# Patient Record
Sex: FEMALE | Race: WHITE | Employment: UNEMPLOYED | URBAN - NONMETROPOLITAN AREA
[De-identification: names, ages, dates, MRNs, and addresses within clinical notes are randomized per-mention and may not be internally consistent; named-entity substitution may affect disease eponyms.]

---

## 2017-01-08 DIAGNOSIS — I10 ESSENTIAL HYPERTENSION: Primary | ICD-10-CM

## 2017-01-09 RX ORDER — LOSARTAN POTASSIUM AND HYDROCHLOROTHIAZIDE 12.5; 5 MG/1; MG/1
TABLET ORAL
Qty: 90 TABLET | Refills: 2 | Status: SHIPPED | OUTPATIENT
Start: 2017-01-09 | End: 2017-03-24 | Stop reason: SDUPTHER

## 2017-01-31 ENCOUNTER — TELEPHONE (OUTPATIENT)
Dept: FAMILY MEDICINE CLINIC | Age: 67
End: 2017-01-31

## 2017-03-24 ENCOUNTER — OFFICE VISIT (OUTPATIENT)
Dept: FAMILY MEDICINE CLINIC | Age: 67
End: 2017-03-24
Payer: MEDICARE

## 2017-03-24 VITALS
HEIGHT: 66 IN | BODY MASS INDEX: 38.25 KG/M2 | SYSTOLIC BLOOD PRESSURE: 136 MMHG | OXYGEN SATURATION: 96 % | RESPIRATION RATE: 16 BRPM | WEIGHT: 238 LBS | HEART RATE: 74 BPM | DIASTOLIC BLOOD PRESSURE: 88 MMHG

## 2017-03-24 DIAGNOSIS — I10 ESSENTIAL HYPERTENSION: Primary | ICD-10-CM

## 2017-03-24 DIAGNOSIS — Z11.4 ENCOUNTER FOR SCREENING FOR HIV: ICD-10-CM

## 2017-03-24 DIAGNOSIS — F41.1 GENERALIZED ANXIETY DISORDER: ICD-10-CM

## 2017-03-24 DIAGNOSIS — Z11.59 NEED FOR HEPATITIS C SCREENING TEST: ICD-10-CM

## 2017-03-24 DIAGNOSIS — Z23 NEED FOR PNEUMOCOCCAL VACCINE: ICD-10-CM

## 2017-03-24 PROCEDURE — G8484 FLU IMMUNIZE NO ADMIN: HCPCS | Performed by: FAMILY MEDICINE

## 2017-03-24 PROCEDURE — 3017F COLORECTAL CA SCREEN DOC REV: CPT | Performed by: FAMILY MEDICINE

## 2017-03-24 PROCEDURE — G8417 CALC BMI ABV UP PARAM F/U: HCPCS | Performed by: FAMILY MEDICINE

## 2017-03-24 PROCEDURE — 1123F ACP DISCUSS/DSCN MKR DOCD: CPT | Performed by: FAMILY MEDICINE

## 2017-03-24 PROCEDURE — G8427 DOCREV CUR MEDS BY ELIG CLIN: HCPCS | Performed by: FAMILY MEDICINE

## 2017-03-24 PROCEDURE — 99213 OFFICE O/P EST LOW 20 MIN: CPT | Performed by: FAMILY MEDICINE

## 2017-03-24 PROCEDURE — 3014F SCREEN MAMMO DOC REV: CPT | Performed by: FAMILY MEDICINE

## 2017-03-24 PROCEDURE — 90732 PPSV23 VACC 2 YRS+ SUBQ/IM: CPT | Performed by: FAMILY MEDICINE

## 2017-03-24 PROCEDURE — 1090F PRES/ABSN URINE INCON ASSESS: CPT | Performed by: FAMILY MEDICINE

## 2017-03-24 PROCEDURE — G0009 ADMIN PNEUMOCOCCAL VACCINE: HCPCS | Performed by: FAMILY MEDICINE

## 2017-03-24 PROCEDURE — G8400 PT W/DXA NO RESULTS DOC: HCPCS | Performed by: FAMILY MEDICINE

## 2017-03-24 PROCEDURE — 4040F PNEUMOC VAC/ADMIN/RCVD: CPT | Performed by: FAMILY MEDICINE

## 2017-03-24 PROCEDURE — 1036F TOBACCO NON-USER: CPT | Performed by: FAMILY MEDICINE

## 2017-03-24 RX ORDER — LOSARTAN POTASSIUM AND HYDROCHLOROTHIAZIDE 12.5; 5 MG/1; MG/1
TABLET ORAL
Qty: 90 TABLET | Refills: 3 | Status: SHIPPED | OUTPATIENT
Start: 2017-03-24 | End: 2018-03-20 | Stop reason: SDUPTHER

## 2017-03-24 RX ORDER — CLORAZEPATE DIPOTASSIUM 7.5 MG/1
7.5 TABLET ORAL 2 TIMES DAILY PRN
Qty: 100 TABLET | Refills: 0 | Status: SHIPPED | OUTPATIENT
Start: 2017-03-24 | End: 2017-06-29 | Stop reason: SDUPTHER

## 2017-03-24 ASSESSMENT — PATIENT HEALTH QUESTIONNAIRE - PHQ9
2. FEELING DOWN, DEPRESSED OR HOPELESS: 0
1. LITTLE INTEREST OR PLEASURE IN DOING THINGS: 0
SUM OF ALL RESPONSES TO PHQ QUESTIONS 1-9: 0
SUM OF ALL RESPONSES TO PHQ9 QUESTIONS 1 & 2: 0

## 2017-03-24 ASSESSMENT — ENCOUNTER SYMPTOMS
SHORTNESS OF BREATH: 0
CHEST TIGHTNESS: 0
COUGH: 0
WHEEZING: 0

## 2017-06-29 DIAGNOSIS — F41.1 GENERALIZED ANXIETY DISORDER: ICD-10-CM

## 2017-06-29 RX ORDER — CLORAZEPATE DIPOTASSIUM 7.5 MG/1
7.5 TABLET ORAL 2 TIMES DAILY PRN
Qty: 100 TABLET | Refills: 0 | Status: SHIPPED | OUTPATIENT
Start: 2017-06-29 | End: 2017-10-11 | Stop reason: SDUPTHER

## 2017-07-12 ENCOUNTER — TELEPHONE (OUTPATIENT)
Dept: FAMILY MEDICINE CLINIC | Age: 67
End: 2017-07-12

## 2017-10-11 DIAGNOSIS — F41.1 GENERALIZED ANXIETY DISORDER: ICD-10-CM

## 2017-10-12 RX ORDER — CLORAZEPATE DIPOTASSIUM 7.5 MG/1
7.5 TABLET ORAL 2 TIMES DAILY PRN
Qty: 100 TABLET | Refills: 0 | Status: SHIPPED | OUTPATIENT
Start: 2017-10-12 | End: 2018-01-22 | Stop reason: SDUPTHER

## 2017-11-30 ENCOUNTER — NURSE ONLY (OUTPATIENT)
Dept: LAB | Age: 67
End: 2017-11-30
Payer: MEDICARE

## 2017-11-30 DIAGNOSIS — Z23 NEED FOR PROPHYLACTIC VACCINATION AND INOCULATION AGAINST INFLUENZA: Primary | ICD-10-CM

## 2017-11-30 PROCEDURE — 90662 IIV NO PRSV INCREASED AG IM: CPT | Performed by: FAMILY MEDICINE

## 2017-11-30 PROCEDURE — G0008 ADMIN INFLUENZA VIRUS VAC: HCPCS | Performed by: FAMILY MEDICINE

## 2017-11-30 NOTE — PROGRESS NOTES
Have you had an allergic reaction to the flu (influenza) shot? no  Are you allergic to eggs or any component of the flu vaccine? no  Do you have a history of Guillain-[place] Syndrome (GBS), which is paralysis after receiving the flu vaccine? no  Are you feeling well today? yes  Flu vaccine given as ordered. Patient tolerated it well. No questions re: VIS information.

## 2017-12-27 ENCOUNTER — OFFICE VISIT (OUTPATIENT)
Dept: FAMILY MEDICINE CLINIC | Age: 67
End: 2017-12-27
Payer: MEDICARE

## 2017-12-27 ENCOUNTER — HOSPITAL ENCOUNTER (OUTPATIENT)
Dept: MAMMOGRAPHY | Age: 67
Discharge: HOME | End: 2017-12-27
Payer: MEDICARE

## 2017-12-27 VITALS
SYSTOLIC BLOOD PRESSURE: 130 MMHG | HEART RATE: 68 BPM | HEIGHT: 66 IN | WEIGHT: 282.4 LBS | TEMPERATURE: 98.2 F | OXYGEN SATURATION: 98 % | BODY MASS INDEX: 45.38 KG/M2 | DIASTOLIC BLOOD PRESSURE: 88 MMHG

## 2017-12-27 DIAGNOSIS — Z12.31 SCREENING MAMMOGRAM, ENCOUNTER FOR: ICD-10-CM

## 2017-12-27 DIAGNOSIS — M19.019 SHOULDER ARTHRITIS: Primary | ICD-10-CM

## 2017-12-27 DIAGNOSIS — Z00.00 ROUTINE GENERAL MEDICAL EXAMINATION AT A HEALTH CARE FACILITY: ICD-10-CM

## 2017-12-27 DIAGNOSIS — E78.2 MIXED HYPERLIPIDEMIA: ICD-10-CM

## 2017-12-27 DIAGNOSIS — R73.02 IMPAIRED GLUCOSE TOLERANCE: ICD-10-CM

## 2017-12-27 PROCEDURE — 1123F ACP DISCUSS/DSCN MKR DOCD: CPT | Performed by: FAMILY MEDICINE

## 2017-12-27 PROCEDURE — G8400 PT W/DXA NO RESULTS DOC: HCPCS | Performed by: FAMILY MEDICINE

## 2017-12-27 PROCEDURE — 1036F TOBACCO NON-USER: CPT | Performed by: FAMILY MEDICINE

## 2017-12-27 PROCEDURE — G8417 CALC BMI ABV UP PARAM F/U: HCPCS | Performed by: FAMILY MEDICINE

## 2017-12-27 PROCEDURE — 3014F SCREEN MAMMO DOC REV: CPT | Performed by: FAMILY MEDICINE

## 2017-12-27 PROCEDURE — 99213 OFFICE O/P EST LOW 20 MIN: CPT | Performed by: FAMILY MEDICINE

## 2017-12-27 PROCEDURE — 77063 BREAST TOMOSYNTHESIS BI: CPT

## 2017-12-27 PROCEDURE — G0438 PPPS, INITIAL VISIT: HCPCS | Performed by: FAMILY MEDICINE

## 2017-12-27 PROCEDURE — 3017F COLORECTAL CA SCREEN DOC REV: CPT | Performed by: FAMILY MEDICINE

## 2017-12-27 PROCEDURE — 4040F PNEUMOC VAC/ADMIN/RCVD: CPT | Performed by: FAMILY MEDICINE

## 2017-12-27 PROCEDURE — 1090F PRES/ABSN URINE INCON ASSESS: CPT | Performed by: FAMILY MEDICINE

## 2017-12-27 PROCEDURE — G8427 DOCREV CUR MEDS BY ELIG CLIN: HCPCS | Performed by: FAMILY MEDICINE

## 2017-12-27 PROCEDURE — G8484 FLU IMMUNIZE NO ADMIN: HCPCS | Performed by: FAMILY MEDICINE

## 2017-12-27 ASSESSMENT — ENCOUNTER SYMPTOMS
CHEST TIGHTNESS: 0
WHEEZING: 0
COUGH: 0
SHORTNESS OF BREATH: 0
BACK PAIN: 0

## 2017-12-27 ASSESSMENT — ANXIETY QUESTIONNAIRES: GAD7 TOTAL SCORE: 0

## 2017-12-27 ASSESSMENT — LIFESTYLE VARIABLES: HOW OFTEN DO YOU HAVE A DRINK CONTAINING ALCOHOL: 0

## 2017-12-27 ASSESSMENT — PATIENT HEALTH QUESTIONNAIRE - PHQ9: SUM OF ALL RESPONSES TO PHQ QUESTIONS 1-9: 0

## 2017-12-27 NOTE — PATIENT INSTRUCTIONS
exercise material, such as surgical tubing or Thera-Band. 1. Put the band around a solid object at about waist level. (A bedpost will work well.) Each hand should hold an end of the band. 2. With your elbows at your sides and bent to 90 degrees, pull the band back. Your shoulder blades should move toward each other. Return to the starting position. 3. Repeat 8 to 12 times. External rotator strengthening exercise    1. Start by tying a piece of elastic exercise material to a doorknob. You can use surgical tubing or Thera-Band. (You may also hold one end of the band in each hand.)  2. Stand or sit with your shoulder relaxed and your elbow bent 90 degrees. Your upper arm should rest comfortably against your side. Squeeze a rolled towel between your elbow and your body for comfort. This will help keep your arm at your side. 3. Hold one end of the elastic band with the hand of the painful arm. 4. Start with your forearm across your belly. Slowly rotate the forearm out away from your body. Keep your elbow and upper arm tucked against the towel roll or the side of your body until you begin to feel tightness in your shoulder. Slowly move your arm back to where you started. 5. Repeat 8 to 12 times. Internal rotator strengthening exercise    1. Start by tying a piece of elastic exercise material to a doorknob. You can use surgical tubing or Thera-Band. 2. Stand or sit with your shoulder relaxed and your elbow bent 90 degrees. Your upper arm should rest comfortably against your side. Squeeze a rolled towel between your elbow and your body for comfort. This will help keep your arm at your side. 3. Hold one end of the elastic band in the hand of the painful arm. 4. Slowly rotate your forearm toward your body until it touches your belly. Slowly move it back to where you started. 5. Keep your elbow and upper arm firmly tucked against the towel roll or at your side. 6. Repeat 8 to 12 times.   Pendulum swing    If you include a comprehensive review of your medical history including lifestyle, illnesses that may run in your family, and various assessments and screenings as appropriate. After reviewing your medical record and screening and assessments performed today your provider may have ordered immunizations, labs, imaging, and/or referrals for you. A list of these orders (if applicable) as well as your Preventive Care list are included within your After Visit Summary for your review. Other Preventive Recommendations:    · A preventive eye exam performed by an eye specialist is recommended every 1-2 years to screen for glaucoma; cataracts, macular degeneration, and other eye disorders. · A preventive dental visit is recommended every 6 months. · Try to get at least 150 minutes of exercise per week or 10,000 steps per day on a pedometer . · Order or download the FREE \"Exercise & Physical Activity: Your Everyday Guide\" from The [employer]. Call 2-454.194.8400 or search The [employer] online. · You need 7583-8803 mg of calcium and 6287-3079 IU of vitamin D per day. It is possible to meet your calcium requirement with diet alone, but a vitamin D supplement is usually necessary to meet this goal.  · When exposed to the sun, use a sunscreen that protects against both UVA and UVB radiation with an SPF of 30 or greater. Reapply every 2 to 3 hours or after sweating, drying off with a towel, or swimming. · Always wear a seat belt when traveling in a car. Always wear a helmet when riding a bicycle or motorcycle.

## 2017-12-27 NOTE — PROGRESS NOTES
Medicare Annual Wellness Visit  Name: Juliann Clock Date: 2017   MRN: [id number] Sex: Female   Age: 78 y.o. Ethnicity: Non-/Non    : 1950 Race: Lazaro Jones is here for Medicare AWV and Other (had a right arm pain- for about a month and went away and also had a day that she had jaw pain too)    Screenings for behavioral, psychosocial and functional/safety risks, and cognitive dysfunction are all negative except as indicated below. These results, as well as other patient data from the [address] form, are documented in Flowsheets linked to this Encounter. Allergies   Allergen Reactions    Darvocet A500 [Propoxyphene N-Acetaminophen]     Demadex [Torsemide]     Esomeprazole Magnesium     Lorazepam     Norvasc [Amlodipine]     Prochlorperazine Edisylate     Sulfa Antibiotics        Prior to Visit Medications    Medication Sig Taking? Authorizing Provider   clorazepate (TRANXENE) 7.5 MG tablet Take 1 tablet by mouth 2 times daily as needed for Anxiety or Sleep Yes Ayala Vale MD   losartan-hydrochlorothiazide (HYZAAR) 50-12.5 MG per tablet TAKE 1 TABLET DAILY Yes Ayala Vale MD   aspirin 81 MG tablet Take 81 mg by mouth daily. Yes Historical Provider, MD       Past Medical History:   Diagnosis Date    BCC (basal cell carcinoma of skin)     (sees dermatologist regularly).  Chronic insomnia     Claustrophobia     Generalized anxiety disorder     GERD (gastroesophageal reflux disease)     Hypertension     Impaired glucose tolerance     Mitral valve prolapse     Mixed hyperlipidemia     Myopia with astigmatism and presbyopia     bilateral    Osteoarthritis     Post herpetic neuralgia     shingles, thoracic    Ptosis     (right eye) - mild.     Pulmonary nodule     followed by pulmonology    Renal lithiasis     Unspecified vitamin D deficiency      Past Surgical History:   Procedure Laterality Date    APPENDECTOMY   (incidental)    BREAST BIOPSY      benign    CHOLECYSTECTOMY  87-    DILATION AND CURETTAGE OF UTERUS      (x2)    FEMORAL HERNIA REPAIR Right 87-    (Dr. Rayray Hernandez)   [facility] HYSTEROSCOPY  12-    with D&C.  CARLOS AND BSO      (for uterine prolapse and bleeding)    TONSILLECTOMY      UPPER GASTROINTESTINAL ENDOSCOPY  09-    (Dr. Marilu Christiansen) - Normal.  (Path: Mild chronic gastritis. Negative for Helicobacter Pylori. Normal esophagus). Family History   Problem Relation Age of Onset   [facility] Coronary Art Dis Mother      CHF    Alzheimer's Disease Mother     Stroke Mother      TIA    Urolithiasis Mother     Coronary Art Dis Father      CHF    Heart Attack Father     Hypertension Sister     Hypertension Brother     Coronary Art Dis Brother      status post coronary artery bypass graft, with arrhytmia.  Stroke Other     Other Daughter      mitral valve prolapse.  Other Child      melanoma - chest wall.  Other Brother      tobacco abuse.     Arrhythmia Brother     Urolithiasis Child        CareTeam (Including outside providers/suppliers regularly involved in providing care):   Patient Care Team:  Tiffanie Welch MD as PCP - General (Family Medicine)  Tiffanie Welch MD as PCP - S Attributed Provider    Wt Readings from Last 3 Encounters:   12/27/17 282 lb 6.4 oz (128.1 kg)   03/24/17 238 lb (108 kg)   09/16/16 235 lb (106.6 kg)     Vitals:    12/27/17 1114   BP: 130/88   Pulse: 68   Temp: 98.2 °F (36.8 °C)   TempSrc: Tympanic   SpO2: 98%   Weight: 282 lb 6.4 oz (128.1 kg)   Height: 5' 5.5\" (1.664 m)       General Appearance: alert and oriented to person, place and time, well-developed and well-nourished, in no acute distress  Skin: warm and dry, no rash or erythema  Eyes: pupils equal, round, and reactive to light, extraocular eye movements intact, conjunctivae normal  Neck: neck supple and non tender without mass, no thyromegaly or thyroid nodules, no cervical lymphadenopathy Pulmonary/Chest: clear to auscultation bilaterally- no wheezes, rales or rhonchi, normal air movement, no respiratory distress  Cardiovascular: normal rate, normal S1 and S2, no gallops, intact distal pulses and no carotid bruits  Abdomen: soft, non-tender, non-distended, normal bowel sounds, no masses or organomegaly  Extremities: no edema  Musculoskeletal: normal range of motion, no joint swelling, deformity or tenderness, ROM-  normal, no swollen joints, no joint deformity and no crepitus  Neurologic: gait and coordination normal and speech normal    Patient's complete Health Risk Assessment and screening values have been reviewed and are found in Flowsheets. The following problems were reviewed today and where indicated follow up appointments were made and/or referrals ordered. Positive Risk Factor Screenings with Interventions:     General Health:  General  In general, how would you say your health is?: Excellent  In the past 7 days, have you experienced any of the following?: None of These  Do you get the social and emotional support that you need?: Yes  Do you have a Living Will?: (!) No  General Health Risk Interventions:  · No Living Will: patient declined    Health Habits/Nutrition:  Health Habits/Nutrition  Do you exercise for at least 20 minutes 2-3 times per week?: Yes  Have you lost any weight without trying in the past 3 months?: No  Do you eat fewer than 2 meals per day?: No  Have you seen a dentist within the past year?: Yes  Body mass index is 46.28 kg/m².   Health Habits/Nutrition Interventions:  · None indicated    Safety:  Safety  Do you have working smoke detectors?: Yes  Have all throw rugs been removed or fastened?: Yes  Do you have non-slip mats in all bathtubs?: (!) No  Do all of your stairways have a railing or banister?: (!) No (lives in a ranch style house no stairs)  Are your doorways, halls and stairs free of clutter?: Yes  Do you always fasten your seatbelt when you are in a car?:

## 2017-12-27 NOTE — PROGRESS NOTES
Outpatient Prescriptions   Medication Sig Dispense Refill    clorazepate (TRANXENE) 7.5 MG tablet Take 1 tablet by mouth 2 times daily as needed for Anxiety or Sleep 100 tablet 0    losartan-hydrochlorothiazide (HYZAAR) 50-12.5 MG per tablet TAKE 1 TABLET DAILY 90 tablet 3    aspirin 81 MG tablet Take 81 mg by mouth daily. No current facility-administered medications for this visit. Allergies   Allergen Reactions    Darvocet A500 [Propoxyphene N-Acetaminophen]     Demadex [Torsemide]     Esomeprazole Magnesium     Lorazepam     Norvasc [Amlodipine]     Prochlorperazine Edisylate     Sulfa Antibiotics        Subjective:      Review of Systems   Constitutional: Negative for activity change, appetite change, chills, fatigue and fever. Eyes: Negative for visual disturbance. Respiratory: Negative for cough, chest tightness, shortness of breath and wheezing. Cardiovascular: Negative for chest pain, palpitations and leg swelling. Musculoskeletal: Positive for arthralgias (right shoulder) and stiffness. Negative for back pain, neck pain and neck stiffness. Skin: Negative for rash. Neurological: Negative for dizziness, tingling, syncope, weakness, light-headedness, numbness and headaches. Objective:     Physical Exam   Constitutional: She is oriented to person, place, and time. She appears well-developed and well-nourished. No distress. Eyes: Conjunctivae and EOM are normal. Pupils are equal, round, and reactive to light. Neck: Normal range of motion. Neck supple. No thyromegaly present. Cardiovascular: Normal rate, regular rhythm, normal heart sounds and intact distal pulses. No murmur heard. Pulmonary/Chest: Effort normal and breath sounds normal. No respiratory distress. She has no wheezes. Musculoskeletal: She exhibits no edema.         Right shoulder: She exhibits normal range of motion, no tenderness, no effusion, no crepitus, no spasm, normal pulse and normal strength. Lymphadenopathy:     She has no cervical adenopathy. Neurological: She is alert and oriented to person, place, and time. She has normal strength. No sensory deficit. Reflex Scores:       Bicep reflexes are 2+ on the right side and 2+ on the left side. Skin: Skin is warm and dry. No rash noted. No erythema. Nursing note and vitals reviewed. /88   Pulse 68   Temp 98.2 °F (36.8 °C) (Tympanic)   Ht 5' 5.5\" (1.664 m)   Wt 282 lb 6.4 oz (128.1 kg)   SpO2 98%   BMI 46.28 kg/m²     Assessment:      1. Shoulder arthritis     2. Mixed hyperlipidemia  Lipid Panel   3. Impaired glucose tolerance  Hemoglobin A1C   4. Routine general medical examination at a health care facility     5. Screening mammogram, encounter for  KEZIA DIGITAL SCREEN W OR WO CAD BILATERAL             Plan:       Shoulder arthritis: new; likely caused by carrying her grandson. I recommended nsaids, ice, heat and shoulder exercises which were provided. I also offered an injection if the pain gets too bad. Hyperlipidemia: stable; she is due for a cholesterol level so that was ordered. Impaired fasting glucose: stable; she is due for an a1c so that was ordered. Return in 1 year (on 12/27/2018) for Medicare Annual Wellness Visit in 1 year. Orders Placed This Encounter   Procedures    KEZIA DIGITAL SCREEN W OR WO CAD BILATERAL     Standing Status:   Future     Number of Occurrences:   1     Standing Expiration Date:   2/27/2019    Lipid Panel     Standing Status:   Future     Standing Expiration Date:   12/27/2018     Order Specific Question:   Is Patient Fasting?/# of Hours     Answer:   0 non fasting    Hemoglobin A1C     Standing Status:   Future     Standing Expiration Date:   12/27/2018       Patient given educational materials - see patient instructions. Discussed use, benefit, and side effects of prescribed medications. All patient questions answered. Pt voiced understanding. Reviewed health maintenance. Instructed to continue current medications, diet and exercise. Patient agreed with treatment plan. Follow up as directed.      Electronically signed by Long Knox MD on 12/27/2017 at 12:03 PM

## 2018-01-22 DIAGNOSIS — F41.1 GENERALIZED ANXIETY DISORDER: ICD-10-CM

## 2018-01-22 RX ORDER — CLORAZEPATE DIPOTASSIUM 7.5 MG/1
7.5 TABLET ORAL 2 TIMES DAILY PRN
Qty: 100 TABLET | Refills: 0 | Status: SHIPPED | OUTPATIENT
Start: 2018-01-22 | End: 2018-05-02 | Stop reason: SDUPTHER

## 2018-03-20 DIAGNOSIS — I10 ESSENTIAL HYPERTENSION: ICD-10-CM

## 2018-03-20 RX ORDER — LOSARTAN POTASSIUM AND HYDROCHLOROTHIAZIDE 12.5; 5 MG/1; MG/1
TABLET ORAL
Qty: 90 TABLET | Refills: 1 | Status: SHIPPED | OUTPATIENT
Start: 2018-03-20 | End: 2018-09-16 | Stop reason: SDUPTHER

## 2018-05-02 DIAGNOSIS — F41.1 GENERALIZED ANXIETY DISORDER: ICD-10-CM

## 2018-05-03 RX ORDER — CLORAZEPATE DIPOTASSIUM 7.5 MG/1
7.5 TABLET ORAL 2 TIMES DAILY PRN
Qty: 100 TABLET | Refills: 0 | Status: SHIPPED | OUTPATIENT
Start: 2018-05-03 | End: 2018-08-07 | Stop reason: SDUPTHER

## 2018-06-26 ENCOUNTER — OFFICE VISIT (OUTPATIENT)
Dept: PODIATRY | Age: 68
End: 2018-06-26
Payer: MEDICARE

## 2018-06-26 VITALS
HEIGHT: 66 IN | SYSTOLIC BLOOD PRESSURE: 132 MMHG | BODY MASS INDEX: 39.53 KG/M2 | WEIGHT: 246 LBS | DIASTOLIC BLOOD PRESSURE: 82 MMHG | HEART RATE: 84 BPM

## 2018-06-26 DIAGNOSIS — M72.2 PLANTAR FASCIITIS: Primary | ICD-10-CM

## 2018-06-26 PROCEDURE — 20550 NJX 1 TENDON SHEATH/LIGAMENT: CPT | Performed by: PODIATRIST

## 2018-06-26 PROCEDURE — 99202 OFFICE O/P NEW SF 15 MIN: CPT | Performed by: PODIATRIST

## 2018-06-26 RX ORDER — BETAMETHASONE SODIUM PHOSPHATE AND BETAMETHASONE ACETATE 3; 3 MG/ML; MG/ML
6 INJECTION, SUSPENSION INTRA-ARTICULAR; INTRALESIONAL; INTRAMUSCULAR; SOFT TISSUE ONCE
Status: COMPLETED | OUTPATIENT
Start: 2018-06-26 | End: 2018-06-26

## 2018-06-26 RX ADMIN — BETAMETHASONE SODIUM PHOSPHATE AND BETAMETHASONE ACETATE 6 MG: 3; 3 INJECTION, SUSPENSION INTRA-ARTICULAR; INTRALESIONAL; INTRAMUSCULAR; SOFT TISSUE at 15:00

## 2018-08-07 DIAGNOSIS — F41.1 GENERALIZED ANXIETY DISORDER: ICD-10-CM

## 2018-08-07 RX ORDER — CLORAZEPATE DIPOTASSIUM 7.5 MG/1
7.5 TABLET ORAL 2 TIMES DAILY PRN
Qty: 100 TABLET | Refills: 0 | Status: SHIPPED | OUTPATIENT
Start: 2018-08-07 | End: 2018-11-29 | Stop reason: SDUPTHER

## 2018-09-16 DIAGNOSIS — I10 ESSENTIAL HYPERTENSION: ICD-10-CM

## 2018-09-17 RX ORDER — LOSARTAN POTASSIUM AND HYDROCHLOROTHIAZIDE 12.5; 5 MG/1; MG/1
TABLET ORAL
Qty: 90 TABLET | Refills: 1 | Status: SHIPPED | OUTPATIENT
Start: 2018-09-17 | End: 2019-01-18 | Stop reason: SDUPTHER

## 2018-09-17 NOTE — TELEPHONE ENCOUNTER
Last Appt:  12/27/17  Next Appt:   12/28/2018  Med verified in [facility] 9/17/18

## 2018-09-27 ENCOUNTER — NURSE ONLY (OUTPATIENT)
Dept: LAB | Age: 68
End: 2018-09-27
Payer: MEDICARE

## 2018-09-27 DIAGNOSIS — Z23 NEED FOR PROPHYLACTIC VACCINATION AND INOCULATION AGAINST INFLUENZA: Primary | ICD-10-CM

## 2018-09-27 PROCEDURE — G0008 ADMIN INFLUENZA VIRUS VAC: HCPCS | Performed by: FAMILY MEDICINE

## 2018-09-27 PROCEDURE — 90662 IIV NO PRSV INCREASED AG IM: CPT | Performed by: FAMILY MEDICINE

## 2018-09-27 NOTE — PROGRESS NOTES
Have you had an allergic reaction to the flu (influenza) shot? no  Are you allergic to eggs or any component of the flu vaccine? no  Do you have a history of Guillain-[place] Syndrome (GBS), which is paralysis after receiving the flu vaccine? no  Are you feeling well today? yes  Flu vaccine given as ordered. Patient tolerated it well. No questions re: VIS information.

## 2018-11-29 DIAGNOSIS — F41.1 GENERALIZED ANXIETY DISORDER: ICD-10-CM

## 2018-11-29 RX ORDER — CLORAZEPATE DIPOTASSIUM 7.5 MG/1
7.5 TABLET ORAL 2 TIMES DAILY PRN
Qty: 100 TABLET | Refills: 0 | Status: SHIPPED | OUTPATIENT
Start: 2018-11-29 | End: 2019-01-18 | Stop reason: SDUPTHER

## 2018-11-29 NOTE — TELEPHONE ENCOUNTER
Fax in refill request    Last Appt:  12/27/17  Next Appt:   12/28/2018  Med verified in [address] [place] [address] Rd 11/29/18

## 2018-11-30 ENCOUNTER — OFFICE VISIT (OUTPATIENT)
Dept: PODIATRY | Age: 68
End: 2018-11-30
Payer: MEDICARE

## 2018-11-30 VITALS
HEIGHT: 66 IN | SYSTOLIC BLOOD PRESSURE: 136 MMHG | RESPIRATION RATE: 20 BRPM | HEART RATE: 80 BPM | WEIGHT: 244.6 LBS | BODY MASS INDEX: 39.31 KG/M2 | DIASTOLIC BLOOD PRESSURE: 84 MMHG

## 2018-11-30 DIAGNOSIS — M72.2 PLANTAR FASCIITIS OF LEFT FOOT: Primary | ICD-10-CM

## 2018-11-30 PROCEDURE — 20550 NJX 1 TENDON SHEATH/LIGAMENT: CPT | Performed by: PODIATRIST

## 2018-12-07 RX ORDER — BETAMETHASONE SODIUM PHOSPHATE AND BETAMETHASONE ACETATE 3; 3 MG/ML; MG/ML
6 INJECTION, SUSPENSION INTRA-ARTICULAR; INTRALESIONAL; INTRAMUSCULAR; SOFT TISSUE ONCE
Status: COMPLETED | OUTPATIENT
Start: 2018-12-07 | End: 2018-12-07

## 2018-12-07 RX ADMIN — BETAMETHASONE SODIUM PHOSPHATE AND BETAMETHASONE ACETATE 6 MG: 3; 3 INJECTION, SUSPENSION INTRA-ARTICULAR; INTRALESIONAL; INTRAMUSCULAR; SOFT TISSUE at 12:42

## 2018-12-07 NOTE — PROGRESS NOTES
Celestone injection given to patient by Gerardo Miller during office visit in the left foot. [facility] # [id number], LOT # [id number], EXP DATE 12/2019.

## 2019-01-18 ENCOUNTER — OFFICE VISIT (OUTPATIENT)
Dept: FAMILY MEDICINE CLINIC | Age: 69
End: 2019-01-18
Payer: MEDICARE

## 2019-01-18 VITALS
DIASTOLIC BLOOD PRESSURE: 88 MMHG | BODY MASS INDEX: 38.58 KG/M2 | SYSTOLIC BLOOD PRESSURE: 128 MMHG | TEMPERATURE: 98.1 F | OXYGEN SATURATION: 93 % | HEART RATE: 81 BPM | WEIGHT: 245.8 LBS | HEIGHT: 67 IN

## 2019-01-18 DIAGNOSIS — Z00.00 ROUTINE GENERAL MEDICAL EXAMINATION AT A HEALTH CARE FACILITY: Primary | ICD-10-CM

## 2019-01-18 DIAGNOSIS — E78.2 MIXED HYPERLIPIDEMIA: ICD-10-CM

## 2019-01-18 DIAGNOSIS — Z11.59 ENCOUNTER FOR HEPATITIS C SCREENING TEST FOR LOW RISK PATIENT: ICD-10-CM

## 2019-01-18 DIAGNOSIS — I10 ESSENTIAL HYPERTENSION: ICD-10-CM

## 2019-01-18 DIAGNOSIS — F41.1 GENERALIZED ANXIETY DISORDER: ICD-10-CM

## 2019-01-18 DIAGNOSIS — J40 BRONCHITIS: ICD-10-CM

## 2019-01-18 PROCEDURE — 4040F PNEUMOC VAC/ADMIN/RCVD: CPT | Performed by: FAMILY MEDICINE

## 2019-01-18 PROCEDURE — 1101F PT FALLS ASSESS-DOCD LE1/YR: CPT | Performed by: FAMILY MEDICINE

## 2019-01-18 PROCEDURE — G8482 FLU IMMUNIZE ORDER/ADMIN: HCPCS | Performed by: FAMILY MEDICINE

## 2019-01-18 PROCEDURE — G8400 PT W/DXA NO RESULTS DOC: HCPCS | Performed by: FAMILY MEDICINE

## 2019-01-18 PROCEDURE — 1123F ACP DISCUSS/DSCN MKR DOCD: CPT | Performed by: FAMILY MEDICINE

## 2019-01-18 PROCEDURE — G8427 DOCREV CUR MEDS BY ELIG CLIN: HCPCS | Performed by: FAMILY MEDICINE

## 2019-01-18 PROCEDURE — 99213 OFFICE O/P EST LOW 20 MIN: CPT | Performed by: FAMILY MEDICINE

## 2019-01-18 PROCEDURE — 3017F COLORECTAL CA SCREEN DOC REV: CPT | Performed by: FAMILY MEDICINE

## 2019-01-18 PROCEDURE — G8417 CALC BMI ABV UP PARAM F/U: HCPCS | Performed by: FAMILY MEDICINE

## 2019-01-18 PROCEDURE — G0439 PPPS, SUBSEQ VISIT: HCPCS | Performed by: FAMILY MEDICINE

## 2019-01-18 PROCEDURE — 1090F PRES/ABSN URINE INCON ASSESS: CPT | Performed by: FAMILY MEDICINE

## 2019-01-18 PROCEDURE — 1036F TOBACCO NON-USER: CPT | Performed by: FAMILY MEDICINE

## 2019-01-18 RX ORDER — PREDNISONE 20 MG/1
40 TABLET ORAL DAILY
Qty: 10 TABLET | Refills: 0 | Status: SHIPPED | OUTPATIENT
Start: 2019-01-18 | End: 2019-06-18 | Stop reason: ALTCHOICE

## 2019-01-18 RX ORDER — LOSARTAN POTASSIUM AND HYDROCHLOROTHIAZIDE 12.5; 5 MG/1; MG/1
TABLET ORAL
Qty: 90 TABLET | Refills: 2 | Status: SHIPPED | OUTPATIENT
Start: 2019-01-18 | End: 2019-11-26 | Stop reason: SDUPTHER

## 2019-01-18 RX ORDER — CLORAZEPATE DIPOTASSIUM 7.5 MG/1
7.5 TABLET ORAL 2 TIMES DAILY PRN
Qty: 100 TABLET | Refills: 0 | Status: SHIPPED | OUTPATIENT
Start: 2019-01-18 | End: 2019-06-04 | Stop reason: SDUPTHER

## 2019-01-18 ASSESSMENT — ENCOUNTER SYMPTOMS
SINUS PRESSURE: 0
DIARRHEA: 0
SORE THROAT: 0
CONSTIPATION: 0
ABDOMINAL PAIN: 0
COLOR CHANGE: 0
NAUSEA: 0
COUGH: 1
CHEST TIGHTNESS: 0
SHORTNESS OF BREATH: 0

## 2019-01-18 ASSESSMENT — ANXIETY QUESTIONNAIRES: GAD7 TOTAL SCORE: 0

## 2019-01-18 ASSESSMENT — LIFESTYLE VARIABLES: HOW OFTEN DO YOU HAVE A DRINK CONTAINING ALCOHOL: 0

## 2019-01-18 ASSESSMENT — PATIENT HEALTH QUESTIONNAIRE - PHQ9
SUM OF ALL RESPONSES TO PHQ QUESTIONS 1-9: 0
SUM OF ALL RESPONSES TO PHQ QUESTIONS 1-9: 0

## 2019-02-01 ENCOUNTER — HOSPITAL ENCOUNTER (OUTPATIENT)
Dept: LAB | Age: 69
Discharge: HOME | End: 2019-02-01
Payer: MEDICARE

## 2019-02-01 DIAGNOSIS — I10 ESSENTIAL HYPERTENSION: ICD-10-CM

## 2019-02-01 DIAGNOSIS — E78.2 MIXED HYPERLIPIDEMIA: ICD-10-CM

## 2019-02-01 DIAGNOSIS — Z11.59 ENCOUNTER FOR HEPATITIS C SCREENING TEST FOR LOW RISK PATIENT: ICD-10-CM

## 2019-02-01 LAB
ANION GAP SERPL CALCULATED.3IONS-SCNC: 12 MMOL/L (ref 9–17)
BUN BLDV-MCNC: 22 MG/DL (ref 8–23)
BUN/CREAT BLD: 27 (ref 9–20)
CALCIUM SERPL-MCNC: 9.7 MG/DL (ref 8.6–10.4)
CHLORIDE BLD-SCNC: 101 MMOL/L (ref 98–107)
CHOLESTEROL/HDL RATIO: 3.6
CHOLESTEROL: 188 MG/DL
CO2: 28 MMOL/L (ref 20–31)
CREAT SERPL-MCNC: 0.81 MG/DL (ref 0.5–0.9)
GFR AFRICAN AMERICAN: >60 ML/MIN
GFR NON-AFRICAN AMERICAN: >60 ML/MIN
GFR SERPL CREATININE-BSD FRML MDRD: ABNORMAL ML/MIN/{1.73_M2}
GFR SERPL CREATININE-BSD FRML MDRD: ABNORMAL ML/MIN/{1.73_M2}
GLUCOSE BLD-MCNC: 111 MG/DL (ref 70–99)
HDLC SERPL-MCNC: 52 MG/DL
HEPATITIS C ANTIBODY: NONREACTIVE
LDL CHOLESTEROL: 95 MG/DL (ref 0–130)
POTASSIUM SERPL-SCNC: 4.1 MMOL/L (ref 3.7–5.3)
SODIUM BLD-SCNC: 141 MMOL/L (ref 135–144)
TRIGL SERPL-MCNC: 207 MG/DL
VLDLC SERPL CALC-MCNC: ABNORMAL MG/DL (ref 1–30)

## 2019-02-01 PROCEDURE — 80048 BASIC METABOLIC PNL TOTAL CA: CPT

## 2019-02-01 PROCEDURE — 80061 LIPID PANEL: CPT

## 2019-02-01 PROCEDURE — 36415 COLL VENOUS BLD VENIPUNCTURE: CPT

## 2019-02-01 PROCEDURE — 86803 HEPATITIS C AB TEST: CPT

## 2019-03-04 DIAGNOSIS — Z12.31 SCREENING MAMMOGRAM, ENCOUNTER FOR: Primary | ICD-10-CM

## 2019-03-07 ENCOUNTER — TELEPHONE (OUTPATIENT)
Dept: FAMILY MEDICINE CLINIC | Age: 69
End: 2019-03-07

## 2019-03-07 DIAGNOSIS — Z12.39 SCREENING FOR BREAST CANCER: Primary | ICD-10-CM

## 2019-03-14 ENCOUNTER — HOSPITAL ENCOUNTER (OUTPATIENT)
Dept: MAMMOGRAPHY | Age: 69
Discharge: HOME | End: 2019-03-16
Payer: MEDICARE

## 2019-03-14 DIAGNOSIS — Z12.39 SCREENING FOR BREAST CANCER: ICD-10-CM

## 2019-03-14 PROCEDURE — 77063 BREAST TOMOSYNTHESIS BI: CPT

## 2019-06-04 DIAGNOSIS — F41.1 GENERALIZED ANXIETY DISORDER: ICD-10-CM

## 2019-06-04 RX ORDER — CLORAZEPATE DIPOTASSIUM 7.5 MG/1
7.5 TABLET ORAL 2 TIMES DAILY PRN
Qty: 100 TABLET | Refills: 0 | Status: SHIPPED | OUTPATIENT
Start: 2019-06-04 | End: 2019-09-05 | Stop reason: SDUPTHER

## 2019-06-18 ENCOUNTER — OFFICE VISIT (OUTPATIENT)
Dept: PODIATRY | Age: 69
End: 2019-06-18
Payer: MEDICARE

## 2019-06-18 VITALS
WEIGHT: 242 LBS | SYSTOLIC BLOOD PRESSURE: 132 MMHG | BODY MASS INDEX: 38.89 KG/M2 | HEIGHT: 66 IN | DIASTOLIC BLOOD PRESSURE: 80 MMHG | HEART RATE: 78 BPM

## 2019-06-18 DIAGNOSIS — M72.2 PLANTAR FASCIITIS OF LEFT FOOT: Primary | ICD-10-CM

## 2019-06-18 PROCEDURE — 20550 NJX 1 TENDON SHEATH/LIGAMENT: CPT | Performed by: PODIATRIST

## 2019-06-18 PROCEDURE — 99999 PR OFFICE/OUTPT VISIT,PROCEDURE ONLY: CPT | Performed by: PODIATRIST

## 2019-06-18 RX ORDER — BETAMETHASONE SODIUM PHOSPHATE AND BETAMETHASONE ACETATE 3; 3 MG/ML; MG/ML
6 INJECTION, SUSPENSION INTRA-ARTICULAR; INTRALESIONAL; INTRAMUSCULAR; SOFT TISSUE ONCE
Status: COMPLETED | OUTPATIENT
Start: 2019-06-18 | End: 2019-06-18

## 2019-06-18 RX ADMIN — BETAMETHASONE SODIUM PHOSPHATE AND BETAMETHASONE ACETATE 6 MG: 3; 3 INJECTION, SUSPENSION INTRA-ARTICULAR; INTRALESIONAL; INTRAMUSCULAR; SOFT TISSUE at 08:42

## 2019-06-18 NOTE — PROGRESS NOTES
Celestone 6mg/ml given by dr Jacquelynn Gilford in left heel  [id number] lot [id number] exp date 4/30/20

## 2019-06-18 NOTE — PROGRESS NOTES
Subjective:    Kristen Jules is a 77 y.o. female who presents to the office today complaining of Left heel pain. Symptoms returned 2 months ago. No new trauma or injury. Patient relates pain is Present upon arising from bed in the morning and after periods of rest and then standing. The pain progresses throughout the day. Pain is rated 10 out of 10 and is described as moderate. Currently denies F/C/N/V. Allergies   Allergen Reactions    Darvocet A500 [Propoxyphene N-Acetaminophen]     Demadex [Torsemide]     Esomeprazole Magnesium     Lorazepam     Norvasc [Amlodipine]     Prochlorperazine Edisylate     Sulfa Antibiotics        Past Medical History:   Diagnosis Date    BCC (basal cell carcinoma of skin)     (sees dermatologist regularly).  Chronic insomnia     Claustrophobia     Generalized anxiety disorder     GERD (gastroesophageal reflux disease)     Hypertension     Impaired glucose tolerance     Mitral valve prolapse     Mixed hyperlipidemia     Myopia with astigmatism and presbyopia     bilateral    Osteoarthritis     Post herpetic neuralgia     shingles, thoracic    Ptosis     (right eye) - mild.  Pulmonary nodule     followed by pulmonology    Renal lithiasis     Unspecified vitamin D deficiency        Prior to Admission medications    Medication Sig Start Date End Date Taking? Authorizing Provider   clorazepate (TRANXENE) 7.5 MG tablet Take 1 tablet by mouth 2 times daily as needed for Anxiety or Sleep for up to 90 days. 6/4/19 9/2/19 Yes Georgie Vivar MD   losartan-hydrochlorothiazide [facility]) 50-12.5 MG per tablet TAKE 1 TABLET DAILY 1/18/19  Yes Georgie Vivar MD   aspirin 81 MG tablet Take 81 mg by mouth daily.    Yes Historical Provider, MD       Past Surgical History:   Procedure Laterality Date    APPENDECTOMY  12-    (incidental)    BREAST BIOPSY      benign    CHOLECYSTECTOMY  12-    DILATION AND CURETTAGE OF UTERUS      (x2)    FEMORAL HERNIA REPAIR Right 95-    (Dr. Beba Ovalles)    HYSTEROSCOPY  12-    with D&C.  CARLOS AND BSO      (for uterine prolapse and bleeding)    TONSILLECTOMY      UPPER GASTROINTESTINAL ENDOSCOPY  09-    (Dr. Jeri Elise) - Normal.  (Path: Mild chronic gastritis. Negative for Helicobacter Pylori. Normal esophagus). Family History   Problem Relation Age of Onset   [place] Coronary Art Dis Mother         CHF    Alzheimer's Disease Mother     Stroke Mother         TIA    Urolithiasis Mother     Coronary Art Dis Father         CHF    Heart Attack Father     Hypertension Sister     Hypertension Brother     Coronary Art Dis Brother         status post coronary artery bypass graft, with arrhytmia.  Stroke Other     Other Daughter         mitral valve prolapse.  Other Child         melanoma - chest wall.  Other Brother         tobacco abuse.  Arrhythmia Brother     Urolithiasis Child        Social History     Tobacco Use    Smoking status: Never Smoker    Smokeless tobacco: Never Used   Substance Use Topics    Alcohol use: No     Alcohol/week: 0.0 oz       Review of Systems: All 12 systems reviewed and pertinent positives noted above. Lower Extremity Physical Examination:     Vitals:   Vitals:    06/18/19 0828   BP: 132/80   Pulse: 78     General: AAO x 3 in NAD. Vascular: DP and PT pulses palpable 2/4, bilateral.  CFT <3 seconds, bilateral.  Hair growth present to the level of the digits, bilateral.  Edema present, bilateral.  Varicosities present, bilateral. Erythema absent, bilateral. Distal Rubor absent bilateral.  Temperature within normal limits bilateral. Hyperpigmentation present bilateral. Atrophic skin yes.     Neurological: Sensation intact to light touch to level of digits, bilateral.  Protective sensation intact 10/10 sites via 5.07/10g [place] Monofilament, bilateral.  negative Tinel's, bilateral.  negative Valleix sign, bilateral.  Vibratory intact bilateral. Reflexes Decreased bilateral.  Paresthesias negative. Dysthesias negative. Sharp/dull intact bilateral.    Musculoskeletal: Muscle strength 5/5, Bilateral.  Pain present upon palpation of plantar medial calcaneal tubercle, Left. within normal limits medial longitudinal arch, Bilateral.  Ankle ROM decreased,Bilateral.  1st MPJ ROM within normal limits, Bilateral.  Dorsally contracted digits absent digits Bilateral. Other foot deformities none. Integument: Warm, dry, supple, bilateral.  Open lesion absent, bilateral.  Interdigital maceration absent to web spaces bilateral.  Nails within normal limits. Fissures absent, bilateral. Hyperkeratotic tissue is absent. Asessment: Patient is a 77 y.o. female with:    Diagnosis Orders   1. Plantar fasciitis of left foot         Plan: Patient examined and evaluated. Current condition and treatment options discussed in detail. Patient was given plantar fasciitis instruction sheet. Patient will start stretching, icing, anti-inflammatory, and arch supports in shoe gear 100% of the time WB. Patient will not go barefoot. Verbal consent obtained from patient for Left heel injection after all questions answered. Left heel palpated medially and most tender location adjacent to the medial calcaneal tubercle identified. This area was prepped with an alcohol swab and 0.5 cc of 1%lidocaine plain and 1 cc of celestone was injected to the Left heel. A band-aid was applied, patient advised of possible local steroid reaction symptoms, and patient instructed to limit activities to this area for 24 hours. Contact office with any questions/problems/concerns. Return to office in 3 week(s).

## 2019-09-05 DIAGNOSIS — F41.1 GENERALIZED ANXIETY DISORDER: ICD-10-CM

## 2019-09-05 RX ORDER — CLORAZEPATE DIPOTASSIUM 7.5 MG/1
7.5 TABLET ORAL 2 TIMES DAILY PRN
Qty: 100 TABLET | Refills: 0 | Status: SHIPPED | OUTPATIENT
Start: 2019-09-05 | End: 2019-12-05 | Stop reason: SDUPTHER

## 2019-10-14 ENCOUNTER — IMMUNIZATION (OUTPATIENT)
Dept: LAB | Age: 69
End: 2019-10-14
Payer: MEDICARE

## 2019-10-14 PROCEDURE — G0008 ADMIN INFLUENZA VIRUS VAC: HCPCS | Performed by: FAMILY MEDICINE

## 2019-10-14 PROCEDURE — 90653 IIV ADJUVANT VACCINE IM: CPT | Performed by: FAMILY MEDICINE

## 2019-11-14 ENCOUNTER — TELEPHONE (OUTPATIENT)
Dept: FAMILY MEDICINE CLINIC | Age: 69
End: 2019-11-14

## 2019-11-26 DIAGNOSIS — I10 ESSENTIAL HYPERTENSION: ICD-10-CM

## 2019-11-26 RX ORDER — LOSARTAN POTASSIUM AND HYDROCHLOROTHIAZIDE 12.5; 5 MG/1; MG/1
TABLET ORAL
Qty: 30 TABLET | Refills: 1 | Status: SHIPPED | OUTPATIENT
Start: 2019-11-26 | End: 2019-11-26 | Stop reason: SDUPTHER

## 2019-11-26 RX ORDER — LOSARTAN POTASSIUM AND HYDROCHLOROTHIAZIDE 12.5; 5 MG/1; MG/1
TABLET ORAL
Qty: 90 TABLET | Refills: 3 | Status: SHIPPED | OUTPATIENT
Start: 2019-11-26 | End: 2020-02-28 | Stop reason: SDUPTHER

## 2019-12-05 DIAGNOSIS — F41.1 GENERALIZED ANXIETY DISORDER: ICD-10-CM

## 2019-12-05 RX ORDER — CLORAZEPATE DIPOTASSIUM 7.5 MG/1
7.5 TABLET ORAL 2 TIMES DAILY PRN
Qty: 100 TABLET | Refills: 0 | Status: SHIPPED | OUTPATIENT
Start: 2019-12-05 | End: 2020-01-20 | Stop reason: SDUPTHER

## 2020-01-20 ENCOUNTER — HOSPITAL ENCOUNTER (OUTPATIENT)
Dept: LAB | Age: 70
Discharge: HOME | End: 2020-01-20
Payer: MEDICARE

## 2020-01-20 ENCOUNTER — OFFICE VISIT (OUTPATIENT)
Dept: FAMILY MEDICINE CLINIC | Age: 70
End: 2020-01-20
Payer: MEDICARE

## 2020-01-20 VITALS
OXYGEN SATURATION: 94 % | HEIGHT: 65 IN | SYSTOLIC BLOOD PRESSURE: 130 MMHG | BODY MASS INDEX: 40.65 KG/M2 | HEART RATE: 81 BPM | WEIGHT: 244 LBS | DIASTOLIC BLOOD PRESSURE: 90 MMHG

## 2020-01-20 LAB
ANION GAP SERPL CALCULATED.3IONS-SCNC: 13 MMOL/L (ref 9–17)
BUN BLDV-MCNC: 21 MG/DL (ref 8–23)
BUN/CREAT BLD: 31 (ref 9–20)
CALCIUM SERPL-MCNC: 9.8 MG/DL (ref 8.6–10.4)
CHLORIDE BLD-SCNC: 102 MMOL/L (ref 98–107)
CHOLESTEROL/HDL RATIO: 4.9
CHOLESTEROL: 204 MG/DL
CO2: 25 MMOL/L (ref 20–31)
CREAT SERPL-MCNC: 0.67 MG/DL (ref 0.5–0.9)
ESTIMATED AVERAGE GLUCOSE: 120 MG/DL
GFR AFRICAN AMERICAN: >60 ML/MIN
GFR NON-AFRICAN AMERICAN: >60 ML/MIN
GFR SERPL CREATININE-BSD FRML MDRD: ABNORMAL ML/MIN/{1.73_M2}
GFR SERPL CREATININE-BSD FRML MDRD: ABNORMAL ML/MIN/{1.73_M2}
GLUCOSE BLD-MCNC: 104 MG/DL (ref 70–99)
HBA1C MFR BLD: 5.8 % (ref 4.8–5.9)
HDLC SERPL-MCNC: 42 MG/DL
LDL CHOLESTEROL: 119 MG/DL (ref 0–130)
POTASSIUM SERPL-SCNC: 4.6 MMOL/L (ref 3.7–5.3)
SODIUM BLD-SCNC: 140 MMOL/L (ref 135–144)
TRIGL SERPL-MCNC: 213 MG/DL
VLDLC SERPL CALC-MCNC: ABNORMAL MG/DL (ref 1–30)

## 2020-01-20 PROCEDURE — 80061 LIPID PANEL: CPT

## 2020-01-20 PROCEDURE — 3017F COLORECTAL CA SCREEN DOC REV: CPT | Performed by: FAMILY MEDICINE

## 2020-01-20 PROCEDURE — 99213 OFFICE O/P EST LOW 20 MIN: CPT | Performed by: FAMILY MEDICINE

## 2020-01-20 PROCEDURE — 4040F PNEUMOC VAC/ADMIN/RCVD: CPT | Performed by: FAMILY MEDICINE

## 2020-01-20 PROCEDURE — 83036 HEMOGLOBIN GLYCOSYLATED A1C: CPT

## 2020-01-20 PROCEDURE — 36415 COLL VENOUS BLD VENIPUNCTURE: CPT

## 2020-01-20 PROCEDURE — G8482 FLU IMMUNIZE ORDER/ADMIN: HCPCS | Performed by: FAMILY MEDICINE

## 2020-01-20 PROCEDURE — 99397 PER PM REEVAL EST PAT 65+ YR: CPT

## 2020-01-20 PROCEDURE — 1123F ACP DISCUSS/DSCN MKR DOCD: CPT | Performed by: FAMILY MEDICINE

## 2020-01-20 PROCEDURE — G0439 PPPS, SUBSEQ VISIT: HCPCS | Performed by: FAMILY MEDICINE

## 2020-01-20 PROCEDURE — 80048 BASIC METABOLIC PNL TOTAL CA: CPT

## 2020-01-20 RX ORDER — CLORAZEPATE DIPOTASSIUM 7.5 MG/1
7.5 TABLET ORAL 2 TIMES DAILY PRN
Qty: 100 TABLET | Refills: 0 | Status: SHIPPED | OUTPATIENT
Start: 2020-01-20 | End: 2020-10-26 | Stop reason: SDUPTHER

## 2020-01-20 ASSESSMENT — LIFESTYLE VARIABLES: HOW OFTEN DO YOU HAVE A DRINK CONTAINING ALCOHOL: 0

## 2020-01-20 ASSESSMENT — PATIENT HEALTH QUESTIONNAIRE - PHQ9
SUM OF ALL RESPONSES TO PHQ QUESTIONS 1-9: 0
SUM OF ALL RESPONSES TO PHQ QUESTIONS 1-9: 0

## 2020-01-20 ASSESSMENT — ENCOUNTER SYMPTOMS
WHEEZING: 0
CHEST TIGHTNESS: 0
CONSTIPATION: 0
BACK PAIN: 0
ABDOMINAL PAIN: 0
COUGH: 0
SHORTNESS OF BREATH: 0
DIARRHEA: 0

## 2020-01-20 NOTE — PATIENT INSTRUCTIONS
care if:    · You have new or worse pain.     · Your foot is cool or pale or changes color.     · You have tingling, weakness, or numbness in your foot or toes.     · You have signs of a blood clot in your leg (called a deep vein thrombosis), such as:  ? Pain in your calf, back of the knee, thigh, or groin. ? Redness or swelling in your leg.    Watch closely for changes in your health, and be sure to contact your doctor if:    · You do not get better as expected. Where can you learn more? Go to [email or web address].[email or web address] org and sign in to your [employer] account. Enter Y222 in the [employer]ge box to learn more about \"Baker's Cyst: Care Instructions. \"     If you do not have an account, please click on the \"Sign Up Now\" link. Current as of: June 26, 2019  Content Version: 12.3  © 2015-3740 [employer]. Care instructions adapted under license by [facility] [facility] ([facility]). If you have questions about a medical condition or this instruction, always ask your healthcare professional. [place] [address] any warranty or liability for your use of this information. Personalized Preventive Plan for Ward Ramires - 1/20/2020  Medicare offers a range of preventive health benefits. Some of the tests and screenings are paid in full while other may be subject to a deductible, co-insurance, and/or copay. Some of these benefits include a comprehensive review of your medical history including lifestyle, illnesses that may run in your family, and various assessments and screenings as appropriate. After reviewing your medical record and screening and assessments performed today your provider may have ordered immunizations, labs, imaging, and/or referrals for you. A list of these orders (if applicable) as well as your Preventive Care list are included within your After Visit Summary for your review.     Other Preventive Recommendations:    · A preventive eye exam performed by an eye specialist is recommended every 1-2 years to screen for glaucoma; cataracts, macular degeneration, and other eye disorders. · A preventive dental visit is recommended every 6 months. · Try to get at least 150 minutes of exercise per week or 10,000 steps per day on a pedometer . · Order or download the FREE \"Exercise & Physical Activity: Your Everyday Guide\" from [employer]. Call 7-808.382.1794 or search [employer] online. · You need 5689-2462 mg of calcium and 8827-8604 IU of vitamin D per day. It is possible to meet your calcium requirement with diet alone, but a vitamin D supplement is usually necessary to meet this goal.  · When exposed to the sun, use a sunscreen that protects against both UVA and UVB radiation with an SPF of 30 or greater. Reapply every 2 to 3 hours or after sweating, drying off with a towel, or swimming. · Always wear a seat belt when traveling in a car. Always wear a helmet when riding a bicycle or motorcycle.

## 2020-01-23 ENCOUNTER — HOSPITAL ENCOUNTER (OUTPATIENT)
Dept: BONE DENSITY | Age: 70
Discharge: HOME | End: 2020-01-25
Payer: MEDICARE

## 2020-01-23 PROCEDURE — 77085 DXA BONE DENSITY AXL VRT FX: CPT

## 2020-02-12 RX ORDER — HYDROCHLOROTHIAZIDE 25 MG/1
25 TABLET ORAL EVERY MORNING
Qty: 90 TABLET | Refills: 1 | Status: SHIPPED | OUTPATIENT
Start: 2020-02-12 | End: 2021-01-18 | Stop reason: SDUPTHER

## 2020-02-12 RX ORDER — LOSARTAN POTASSIUM 50 MG/1
50 TABLET ORAL DAILY
Qty: 90 TABLET | Refills: 1 | Status: SHIPPED | OUTPATIENT
Start: 2020-02-12 | End: 2020-02-12 | Stop reason: SDUPTHER

## 2020-02-12 RX ORDER — LOSARTAN POTASSIUM 50 MG/1
75 TABLET ORAL DAILY
Qty: 135 TABLET | Refills: 1 | Status: SHIPPED | OUTPATIENT
Start: 2020-02-12 | End: 2021-01-18 | Stop reason: SDUPTHER

## 2020-02-12 RX ORDER — HYDROCHLOROTHIAZIDE 12.5 MG/1
12.5 CAPSULE, GELATIN COATED ORAL DAILY
Qty: 90 CAPSULE | Refills: 1 | Status: SHIPPED | OUTPATIENT
Start: 2020-02-12 | End: 2020-02-12

## 2020-02-12 RX ORDER — LOSARTAN POTASSIUM AND HYDROCHLOROTHIAZIDE 12.5; 5 MG/1; MG/1
TABLET ORAL
OUTPATIENT
Start: 2020-02-12

## 2020-02-12 NOTE — TELEPHONE ENCOUNTER
Spoke to her about sending in 2 meds separately, but there is no easy way for her to do the true 1 and a half tab dose so she will take 1.5 tabs of losartan and she will just double the dose of hctz. She verbalized understanding.

## 2020-02-28 RX ORDER — LOSARTAN POTASSIUM AND HYDROCHLOROTHIAZIDE 12.5; 5 MG/1; MG/1
TABLET ORAL
Qty: 135 TABLET | Refills: 1 | Status: SHIPPED | OUTPATIENT
Start: 2020-02-28 | End: 2020-05-18 | Stop reason: SDUPTHER

## 2020-02-28 NOTE — TELEPHONE ENCOUNTER
Patient states that she needs a new script because she takes 1.5 tabs. Patient wants it sent to [facility].

## 2020-05-18 RX ORDER — LOSARTAN POTASSIUM AND HYDROCHLOROTHIAZIDE 12.5; 5 MG/1; MG/1
TABLET ORAL
Qty: 135 TABLET | Refills: 1 | Status: SHIPPED | OUTPATIENT
Start: 2020-05-18 | End: 2020-12-11 | Stop reason: SDUPTHER

## 2020-08-26 ENCOUNTER — TELEPHONE (OUTPATIENT)
Dept: FAMILY MEDICINE CLINIC | Age: 70
End: 2020-08-26

## 2020-08-26 NOTE — TELEPHONE ENCOUNTER
Unless Arlene Shaw comes in direct contact with the shingles rash she should not be at risk of getting it

## 2020-08-26 NOTE — TELEPHONE ENCOUNTER
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

## 2020-09-26 LAB
ALBUMIN SERPL-MCNC: 3.4 G/DL
ALP BLD-CCNC: 79 U/L
ALT SERPL-CCNC: 19 U/L
ANION GAP SERPL CALCULATED.3IONS-SCNC: NORMAL MMOL/L
AST SERPL-CCNC: 16 U/L
BASOPHILS ABSOLUTE: 0.1 /ΜL
BASOPHILS RELATIVE PERCENT: 0.7 %
BILIRUB SERPL-MCNC: 1 MG/DL (ref 0.1–1.4)
BUN BLDV-MCNC: 19 MG/DL
CALCIUM SERPL-MCNC: 8.6 MG/DL
CHLORIDE BLD-SCNC: 100 MMOL/L
CO2: 27 MMOL/L
CREAT SERPL-MCNC: 1.05 MG/DL
EOSINOPHILS ABSOLUTE: 0.1 /ΜL
EOSINOPHILS RELATIVE PERCENT: 0.6 %
GFR CALCULATED: 52
GLUCOSE BLD-MCNC: 107 MG/DL
HCT VFR BLD CALC: 36.7 % (ref 36–46)
HEMOGLOBIN: 12.5 G/DL (ref 12–16)
INR BLD: 1.2
LYMPHOCYTES ABSOLUTE: 2 /ΜL
LYMPHOCYTES RELATIVE PERCENT: 18.6 %
MCH RBC QN AUTO: 29.5 PG
MCHC RBC AUTO-ENTMCNC: 34 G/DL
MCV RBC AUTO: 87 FL
MONOCYTES ABSOLUTE: 0.9 /ΜL
MONOCYTES RELATIVE PERCENT: 8.2 %
NEUTROPHILS ABSOLUTE: 7.8 /ΜL
NEUTROPHILS RELATIVE PERCENT: 71.9 %
PDW BLD-RTO: NORMAL %
PLATELET # BLD: 200 K/ΜL
PMV BLD AUTO: 7.8 FL
POTASSIUM SERPL-SCNC: 3.5 MMOL/L
PROTIME: 13.7 SECONDS
RBC # BLD: 4.22 10^6/ΜL
SODIUM BLD-SCNC: 134 MMOL/L
TOTAL PROTEIN: 7.4
WBC # BLD: 10.9 10^3/ML

## 2020-10-26 DIAGNOSIS — F41.1 GENERALIZED ANXIETY DISORDER: ICD-10-CM

## 2020-10-26 RX ORDER — CLORAZEPATE DIPOTASSIUM 7.5 MG/1
7.5 TABLET ORAL 2 TIMES DAILY PRN
Qty: 100 TABLET | Refills: 0 | Status: SHIPPED | OUTPATIENT
Start: 2020-10-26 | End: 2021-01-18 | Stop reason: SDUPTHER

## 2020-10-26 NOTE — TELEPHONE ENCOUNTER
Last Appt:  1/20/2020  Next Appt:   1/18/2021    Med contract needs signed   Last Fill: 7/2/2020 #100 for 90    Med verified in Epic

## 2020-12-11 RX ORDER — LOSARTAN POTASSIUM AND HYDROCHLOROTHIAZIDE 12.5; 5 MG/1; MG/1
TABLET ORAL
Qty: 135 TABLET | Refills: 1 | Status: SHIPPED | OUTPATIENT
Start: 2020-12-11 | End: 2021-04-08 | Stop reason: SDUPTHER

## 2020-12-11 NOTE — TELEPHONE ENCOUNTER
Dillan Bean called requesting a refill of the below medication which has been pended for you:     Requested Prescriptions     Pending Prescriptions Disp Refills    losartan-hydroCHLOROthiazide (HYZAAR) 50-12.5 MG per tablet 135 tablet 1     Sig: TAKE 1 and 1/2 TABLETS DAILY       Last Appointment Date: 1/20/2020  Next Appointment Date: 1/18/2021    Allergies   Allergen Reactions    Darvocet A500 [Propoxyphene N-Acetaminophen]     Demadex [Torsemide]     Esomeprazole Magnesium     Lorazepam     Norvasc [Amlodipine]     Prochlorperazine Edisylate     Sulfa Antibiotics

## 2021-01-18 ENCOUNTER — OFFICE VISIT (OUTPATIENT)
Dept: FAMILY MEDICINE CLINIC | Age: 71
End: 2021-01-18
Payer: MEDICARE

## 2021-01-18 VITALS
HEIGHT: 66 IN | TEMPERATURE: 98.1 F | HEART RATE: 83 BPM | DIASTOLIC BLOOD PRESSURE: 80 MMHG | OXYGEN SATURATION: 96 % | SYSTOLIC BLOOD PRESSURE: 138 MMHG | BODY MASS INDEX: 40.18 KG/M2 | WEIGHT: 250 LBS

## 2021-01-18 DIAGNOSIS — F41.1 GENERALIZED ANXIETY DISORDER: ICD-10-CM

## 2021-01-18 DIAGNOSIS — Z00.00 ROUTINE GENERAL MEDICAL EXAMINATION AT A HEALTH CARE FACILITY: Primary | ICD-10-CM

## 2021-01-18 DIAGNOSIS — H93.8X2 SENSATION OF FULLNESS IN LEFT EAR: ICD-10-CM

## 2021-01-18 DIAGNOSIS — I10 ESSENTIAL HYPERTENSION: ICD-10-CM

## 2021-01-18 PROCEDURE — G8484 FLU IMMUNIZE NO ADMIN: HCPCS | Performed by: FAMILY MEDICINE

## 2021-01-18 PROCEDURE — G8417 CALC BMI ABV UP PARAM F/U: HCPCS | Performed by: FAMILY MEDICINE

## 2021-01-18 PROCEDURE — G0439 PPPS, SUBSEQ VISIT: HCPCS | Performed by: FAMILY MEDICINE

## 2021-01-18 PROCEDURE — 99213 OFFICE O/P EST LOW 20 MIN: CPT | Performed by: FAMILY MEDICINE

## 2021-01-18 PROCEDURE — G8427 DOCREV CUR MEDS BY ELIG CLIN: HCPCS | Performed by: FAMILY MEDICINE

## 2021-01-18 PROCEDURE — 1090F PRES/ABSN URINE INCON ASSESS: CPT | Performed by: FAMILY MEDICINE

## 2021-01-18 PROCEDURE — G8400 PT W/DXA NO RESULTS DOC: HCPCS | Performed by: FAMILY MEDICINE

## 2021-01-18 PROCEDURE — 1036F TOBACCO NON-USER: CPT | Performed by: FAMILY MEDICINE

## 2021-01-18 PROCEDURE — 1123F ACP DISCUSS/DSCN MKR DOCD: CPT | Performed by: FAMILY MEDICINE

## 2021-01-18 PROCEDURE — 99212 OFFICE O/P EST SF 10 MIN: CPT

## 2021-01-18 PROCEDURE — 3017F COLORECTAL CA SCREEN DOC REV: CPT | Performed by: FAMILY MEDICINE

## 2021-01-18 PROCEDURE — 4040F PNEUMOC VAC/ADMIN/RCVD: CPT | Performed by: FAMILY MEDICINE

## 2021-01-18 RX ORDER — HYDROCHLOROTHIAZIDE 25 MG/1
25 TABLET ORAL EVERY MORNING
Qty: 90 TABLET | Refills: 1 | Status: SHIPPED | OUTPATIENT
Start: 2021-01-18 | End: 2021-04-08

## 2021-01-18 RX ORDER — LOSARTAN POTASSIUM 50 MG/1
75 TABLET ORAL DAILY
Qty: 135 TABLET | Refills: 1 | Status: SHIPPED | OUTPATIENT
Start: 2021-01-18 | End: 2021-04-08

## 2021-01-18 RX ORDER — CLORAZEPATE DIPOTASSIUM 7.5 MG/1
7.5 TABLET ORAL 2 TIMES DAILY PRN
Qty: 180 TABLET | Refills: 1 | Status: SHIPPED | OUTPATIENT
Start: 2021-01-18 | End: 2021-12-16 | Stop reason: SDUPTHER

## 2021-01-18 SDOH — ECONOMIC STABILITY: FOOD INSECURITY: WITHIN THE PAST 12 MONTHS, YOU WORRIED THAT YOUR FOOD WOULD RUN OUT BEFORE YOU GOT MONEY TO BUY MORE.: PATIENT DECLINED

## 2021-01-18 SDOH — ECONOMIC STABILITY: FOOD INSECURITY: WITHIN THE PAST 12 MONTHS, THE FOOD YOU BOUGHT JUST DIDN'T LAST AND YOU DIDN'T HAVE MONEY TO GET MORE.: PATIENT DECLINED

## 2021-01-18 SDOH — ECONOMIC STABILITY: TRANSPORTATION INSECURITY
IN THE PAST 12 MONTHS, HAS THE LACK OF TRANSPORTATION KEPT YOU FROM MEDICAL APPOINTMENTS OR FROM GETTING MEDICATIONS?: PATIENT DECLINED

## 2021-01-18 SDOH — ECONOMIC STABILITY: INCOME INSECURITY: HOW HARD IS IT FOR YOU TO PAY FOR THE VERY BASICS LIKE FOOD, HOUSING, MEDICAL CARE, AND HEATING?: PATIENT DECLINED

## 2021-01-18 SDOH — ECONOMIC STABILITY: TRANSPORTATION INSECURITY
IN THE PAST 12 MONTHS, HAS LACK OF TRANSPORTATION KEPT YOU FROM MEETINGS, WORK, OR FROM GETTING THINGS NEEDED FOR DAILY LIVING?: PATIENT DECLINED

## 2021-01-18 ASSESSMENT — ENCOUNTER SYMPTOMS
WHEEZING: 0
COUGH: 0
RHINORRHEA: 0
SORE THROAT: 0
CHEST TIGHTNESS: 0
SHORTNESS OF BREATH: 0

## 2021-01-18 ASSESSMENT — LIFESTYLE VARIABLES
AUDIT-C TOTAL SCORE: INCOMPLETE
HOW OFTEN DO YOU HAVE A DRINK CONTAINING ALCOHOL: NEVER
AUDIT TOTAL SCORE: INCOMPLETE

## 2021-01-18 ASSESSMENT — PATIENT HEALTH QUESTIONNAIRE - PHQ9
SUM OF ALL RESPONSES TO PHQ9 QUESTIONS 1 & 2: 0
SUM OF ALL RESPONSES TO PHQ QUESTIONS 1-9: 0
SUM OF ALL RESPONSES TO PHQ QUESTIONS 1-9: 0

## 2021-01-18 NOTE — PROGRESS NOTES
[facility]  [address] [address]  Dept: [phone]  Dept Fax: [phone]  Loc: [phone]    Holly Garcia is a 72 y.o. female who presents today for her medical conditions/complaints as noted below. Holly Garcia is c/o of   Chief Complaint   Patient presents with    Medicare AWV    Ear Fullness     left ear     Other     has scratch top of head which hit in the summer       HPI:     Here today for her MWV, ear fullness, and she had a scratch on her head. Ear Fullness   There is pain in the left ear. This is a new problem. The current episode started more than 1 month ago (2 months). The problem has been gradually improving. There has been no fever. The pain is at a severity of 0/10. The patient is experiencing no pain. Associated symptoms include hearing loss. Pertinent negatives include no coughing, ear discharge, headaches, rash, rhinorrhea or sore throat. Associated symptoms comments: It started with a soreness in her ear and she had swelling of her ear as well. She has tried nothing for the symptoms. The treatment provided no relief. Anxiety: stable; she is doing okay on the clorazepate. She is still nervous and easily worked up about things, but she does not want to try anything else. She has found coping mechanisms that work for her and for the most part she is trying to use those. She gets very anxious when coming into the office right now because she is terrified that she will catch covid. HTN: stable; her blood pressure is normal. She has not had any issues with chest pain or shortness of breath. She gets mild swelling in her legs if she is on her feet all day long. She has not had any issues with headaches or vision changes. Scratch on her head: new; she hit a tree branch while mowing about 8 months ago and she has had a scab since then. She frequently picks the scab and then it reforms and it just won't go away. Past Medical History:   Diagnosis Date    BCC (basal cell carcinoma of skin)     (sees dermatologist regularly).  Chronic insomnia     Claustrophobia     Generalized anxiety disorder     GERD (gastroesophageal reflux disease)     Hypertension     Impaired glucose tolerance     Mitral valve prolapse     Mixed hyperlipidemia     Myopia with astigmatism and presbyopia     bilateral    Osteoarthritis     Post herpetic neuralgia     shingles, thoracic    Ptosis     (right eye) - mild.  Pulmonary nodule     followed by pulmonology    Renal lithiasis     Unspecified vitamin D deficiency           Social History     Tobacco Use    Smoking status: Never Smoker    Smokeless tobacco: Never Used   Substance Use Topics    Alcohol use: No     Alcohol/week: 0.0 standard drinks     Current Outpatient Medications   Medication Sig Dispense Refill    clorazepate (TRANXENE) 7.5 MG tablet Take 1 tablet by mouth 2 times daily as needed for Anxiety or Sleep for up to 90 days. 180 tablet 1    hydroCHLOROthiazide (HYDRODIURIL) 25 MG tablet Take 1 tablet by mouth every morning 90 tablet 1    losartan (COZAAR) 50 MG tablet Take 1.5 tablets by mouth daily 135 tablet 1    losartan-hydroCHLOROthiazide (HYZAAR) 50-12.5 MG per tablet TAKE 1 and 1/2 TABLETS DAILY 135 tablet 1    aspirin 81 MG tablet Take 81 mg by mouth daily. No current facility-administered medications for this visit.            Allergies   Allergen Reactions    Darvocet A500 [Propoxyphene N-Acetaminophen]     Demadex [Torsemide]     Esomeprazole Magnesium     Lorazepam     Norvasc [Amlodipine]     Prochlorperazine Edisylate     Sulfa Antibiotics        Subjective:     Review of Systems Constitutional: Negative for activity change, appetite change, chills, fatigue and fever. HENT: Positive for hearing loss. Negative for ear discharge, rhinorrhea and sore throat. Eyes: Negative for visual disturbance. Respiratory: Negative for cough, chest tightness, shortness of breath and wheezing. Cardiovascular: Negative for chest pain, palpitations and leg swelling. Genitourinary: Negative for difficulty urinating. Skin: Negative for rash. Neurological: Negative for dizziness, syncope, weakness, light-headedness and headaches. Psychiatric/Behavioral: Negative for decreased concentration, dysphoric mood and sleep disturbance. The patient is not nervous/anxious. Objective:      Physical Exam  Vitals signs and nursing note reviewed. Constitutional:       General: She is not in acute distress. Appearance: She is well-developed. HENT:      Head:        Right Ear: Tympanic membrane, ear canal and external ear normal.      Left Ear: Tympanic membrane, ear canal and external ear normal.   Eyes:      Conjunctiva/sclera: Conjunctivae normal.   Neck:      Musculoskeletal: Normal range of motion and neck supple. Thyroid: No thyromegaly. Cardiovascular:      Rate and Rhythm: Normal rate and regular rhythm. Heart sounds: Normal heart sounds. No murmur. Pulmonary:      Effort: Pulmonary effort is normal. No respiratory distress. Breath sounds: Normal breath sounds. No wheezing. Lymphadenopathy:      Cervical: No cervical adenopathy. Skin:     General: Skin is warm and dry. Findings: No erythema or rash. Neurological:      Mental Status: She is alert and oriented to person, place, and time. /80   Pulse 83   Temp 98.1 °F (36.7 °C)   Ht 5' 5.6\" (1.666 m)   Wt 250 lb (113.4 kg)   SpO2 96%   BMI 40.84 kg/m²     Assessment:       Diagnosis Orders   1.  Routine general medical examination at a health care facility 2. Generalized anxiety disorder  clorazepate (TRANXENE) 7.5 MG tablet   3. Essential hypertension     4. Sensation of fullness in left ear               Plan:        MWV: see other note    VICKI: stable; she still gets frequently upset and anxious, but overall she is doing okay and the clorazepate is working for her. HTN: stable; she is doing well on her current medications and even despite being very nervous about this appointment her bp is normal so I will continue her current medications. Ear fullness: new; ears are clear so likely due to eustachian tube dysfunction. I recommended flonase. She was advised to put vaseline on the scab on her head. Return in 1 year (on 1/18/2022) for [address] Orders Placed This Encounter   Medications    clorazepate (TRANXENE) 7.5 MG tablet     Sig: Take 1 tablet by mouth 2 times daily as needed for Anxiety or Sleep for up to 90 days. Dispense:  180 tablet     Refill:  1    hydroCHLOROthiazide (HYDRODIURIL) 25 MG tablet     Sig: Take 1 tablet by mouth every morning     Dispense:  90 tablet     Refill:  1     Please use this script instead of the previous 25mg script sent    losartan (COZAAR) 50 MG tablet     Sig: Take 1.5 tablets by mouth daily     Dispense:  135 tablet     Refill:  1     Please use this script instead of the previous 50mg script sent       Patientgiven educational materials - see patient instructions. Discussed use, benefit,and side effects of prescribed medications. All patient questions answered. Ptvoiced understanding. Reviewed health maintenance. Instructed to continue currentmedications, diet and exercise. Patient agreed with treatment plan. Follow up asdirected.      Electronically signed by oMon Ashley MD on 1/18/2021 at 1:52 PM

## 2021-01-18 NOTE — PATIENT INSTRUCTIONS
Personalized Preventive Plan for Manju Méndez - 1/18/2021  Medicare offers a range of preventive health benefits. Some of the tests and screenings are paid in full while other may be subject to a deductible, co-insurance, and/or copay. Some of these benefits include a comprehensive review of your medical history including lifestyle, illnesses that may run in your family, and various assessments and screenings as appropriate. After reviewing your medical record and screening and assessments performed today your provider may have ordered immunizations, labs, imaging, and/or referrals for you. A list of these orders (if applicable) as well as your Preventive Care list are included within your After Visit Summary for your review. Other Preventive Recommendations:    · A preventive eye exam performed by an eye specialist is recommended every 1-2 years to screen for glaucoma; cataracts, macular degeneration, and other eye disorders. · A preventive dental visit is recommended every 6 months. · Try to get at least 150 minutes of exercise per week or 10,000 steps per day on a pedometer . · Order or download the FREE \"Exercise & Physical Activity: Your Everyday Guide\" from [employer]. Call 7-427.389.3355 or search [employer] online. · You need 1810-8973 mg of calcium and 0128-2734 IU of vitamin D per day. It is possible to meet your calcium requirement with diet alone, but a vitamin D supplement is usually necessary to meet this goal.  · When exposed to the sun, use a sunscreen that protects against both UVA and UVB radiation with an SPF of 30 or greater. Reapply every 2 to 3 hours or after sweating, drying off with a towel, or swimming. · Always wear a seat belt when traveling in a car. Always wear a helmet when riding a bicycle or motorcycle.

## 2021-01-18 NOTE — PROGRESS NOTES
Medicare Annual Wellness Visit  Name: Darryn Fall Date: 2021   MRN: [id number] Sex: Female   Age: 72 y.o. Ethnicity: Non-/Non    : 1950 Race: Shellie Tovar is here for Medicare AWV, Ear Fullness (left ear ), and Other (has scratch top of head which hit in the summer)    Screenings for behavioral, psychosocial and functional/safety risks, and cognitive dysfunction are all negative except as indicated below. These results, as well as other patient data from the [address] form, are documented in Flowsheets linked to this Encounter. Allergies   Allergen Reactions    Darvocet A500 [Propoxyphene N-Acetaminophen]     Demadex [Torsemide]     Esomeprazole Magnesium     Lorazepam     Norvasc [Amlodipine]     Prochlorperazine Edisylate     Sulfa Antibiotics          Prior to Visit Medications    Medication Sig Taking? Authorizing Provider   clorazepate (TRANXENE) 7.5 MG tablet Take 1 tablet by mouth 2 times daily as needed for Anxiety or Sleep for up to 90 days. Yes Sofiya Singleton MD   hydroCHLOROthiazide (HYDRODIURIL) 25 MG tablet Take 1 tablet by mouth every morning Yes Sofiya Singleton MD   losartan (COZAAR) 50 MG tablet Take 1.5 tablets by mouth daily Yes Sofiya Singleton MD   losartan-hydroCHLOROthiazide (HYZAAR) 50-12.5 MG per tablet TAKE 1 and 1/2 TABLETS DAILY Yes Sofiya Singleton MD   aspirin 81 MG tablet Take 81 mg by mouth daily. Yes Historical Provider, MD         Past Medical History:   Diagnosis Date    BCC (basal cell carcinoma of skin)     (sees dermatologist regularly).     Chronic insomnia     Claustrophobia     Generalized anxiety disorder     GERD (gastroesophageal reflux disease)     Hypertension     Impaired glucose tolerance     Mitral valve prolapse     Mixed hyperlipidemia     Myopia with astigmatism and presbyopia     bilateral    Osteoarthritis     Post herpetic neuralgia     shingles, thoracic    Ptosis (right eye) - mild.  Pulmonary nodule     followed by pulmonology    Renal lithiasis     Unspecified vitamin D deficiency        Past Surgical History:   Procedure Laterality Date    APPENDECTOMY  12-    (incidental)    BREAST BIOPSY      benign    CHOLECYSTECTOMY  55-    DILATION AND CURETTAGE OF UTERUS      (x2)    FEMORAL HERNIA REPAIR Right 87-    (Dr. Christian Rios)    HYSTEROSCOPY  12-    with D&C.  CARLOS AND BSO      (for uterine prolapse and bleeding)    TONSILLECTOMY      UPPER GASTROINTESTINAL ENDOSCOPY  09-    (Dr. Andrzej Ortiz) - Normal.  (Path: Mild chronic gastritis. Negative for Helicobacter Pylori. Normal esophagus). Family History   Problem Relation Age of Onset   [de-identified] Coronary Art Dis Mother         CHF    Alzheimer's Disease Mother     Stroke Mother         TIA    Urolithiasis Mother     Coronary Art Dis Father         CHF    Heart Attack Father     Hypertension Sister     Hypertension Brother     Coronary Art Dis Brother         status post coronary artery bypass graft, with arrhytmia.  Stroke Other     Other Daughter         mitral valve prolapse.  Other Child         melanoma - chest wall.  Other Brother         tobacco abuse.  Arrhythmia Brother     Urolithiasis Child        CareTeam (Including outside providers/suppliers regularly involved in providing care):   Patient Care Team:  Sofiya Singleton MD as PCP - General (Family Medicine)  Sofiya Singleton MD as PCP - [facility] Empaneled Provider    Wt Readings from Last 3 Encounters:   01/18/21 250 lb (113.4 kg)   01/20/20 244 lb (110.7 kg)   06/18/19 242 lb (109.8 kg)     Vitals:    01/18/21 1003   BP: 138/80   Pulse: 83   Temp: 98.1 °F (36.7 °C)   SpO2: 96%   Weight: 250 lb (113.4 kg)   Height: 5' 5.6\" (1.666 m)     Body mass index is 40.84 kg/m². Based upon direct observation of the patient, evaluation of cognition reveals recent and remote memory intact. Patient's complete Health Risk Assessment and screening values have been reviewed and are found in Flowsheets. The following problems were reviewed today and where indicated follow up appointments were made and/or referrals ordered. Positive Risk Factor Screenings with Interventions:            General Health and ACP:  General  In general, how would you say your health is?: Very Good  In the past 7 days, have you experienced any of the following?  New or Increased Pain, New or Increased Fatigue, Loneliness, Social Isolation, Stress or Anger?: None of These  Do you get the social and emotional support that you need?: Yes  Do you have a Living Will?: (!) No  Advance Directives     Power of [facility] Will ACP-Advance Directive ACP-Power of     Not on File Not on File Not on File Not on File      General Health Risk Interventions:  · No Living Will: Patient declines ACP discussion/assistance    Health Habits/Nutrition:  Health Habits/Nutrition  Do you exercise for at least 20 minutes 2-3 times per week?: Yes  Have you lost any weight without trying in the past 3 months?: No  Do you eat fewer than 2 meals per day?: No  Have you seen a dentist within the past year?: Yes  Body mass index: (!) 40.84  Health Habits/Nutrition Interventions:  · Nutritional issues:  educational materials for healthy, well-balanced diet provided     Safety:  Safety  Do you have working smoke detectors?: Yes  Have all throw rugs been removed or fastened?: Yes  Do you have non-slip mats or surfaces in all bathtubs/showers?: Yes  Do all of your stairways have a railing or banister?: (!) No  Are your doorways, halls and stairs free of clutter?: Yes  Do you always fasten your seatbelt when you are in a car?: Yes  Safety Interventions:  · Home safety tips provided     Personalized Preventive Plan   Current Health Maintenance Status  Immunization History   Administered Date(s) Administered -     clorazepate (TRANXENE) 7.5 MG tablet; Take 1 tablet by mouth 2 times daily as needed for Anxiety or Sleep for up to 90 days. Essential hypertension    Sensation of fullness in left ear    Other orders  -     hydroCHLOROthiazide (HYDRODIURIL) 25 MG tablet; Take 1 tablet by mouth every morning  -     losartan (COZAAR) 50 MG tablet; Take 1.5 tablets by mouth daily           Return in 1 year (on 1/18/2022) for [address] [place], [place] [address]Bhavesh Marte MD  1/18/2021  1:55 PM

## 2021-02-15 ENCOUNTER — IMMUNIZATION (OUTPATIENT)
Dept: LAB | Age: 71
End: 2021-02-15
Payer: MEDICARE

## 2021-02-15 PROCEDURE — 91301 COVID-19, MODERNA VACCINE 100MCG/0.5ML DOSE: CPT

## 2021-02-18 ENCOUNTER — PATIENT MESSAGE (OUTPATIENT)
Dept: FAMILY MEDICINE CLINIC | Age: 71
End: 2021-02-18

## 2021-02-18 NOTE — TELEPHONE ENCOUNTER
From: Rita Rincon  To: Lisandra Randall MD  Sent: 2/18/2021 3:21 PM EST  Subject: Visit Follow-Up Question    Braden Jacobsen, I am asking for Migdalia Wade. We both got our 1st. shot on Monday the 15th. She had a cough when she got her shot be she thought is was from shoveling snow that weekend. This morning she does not have any smell or taste. (also some dyaria and symptoms of pneumonia) the health deparment told her should would probably test positive because of getting the shot. Her question is how long should she quarentine and should she be tested. Sorry to bother you, but could you please give her a call in the near future and give your opinion of what should be done. Thank you!  Sincerely, Yani Albert

## 2021-03-15 ENCOUNTER — IMMUNIZATION (OUTPATIENT)
Dept: LAB | Age: 71
End: 2021-03-15
Payer: MEDICARE

## 2021-03-15 PROCEDURE — 91301 COVID-19, MODERNA VACCINE 100MCG/0.5ML DOSE: CPT

## 2021-04-08 ENCOUNTER — TELEPHONE (OUTPATIENT)
Dept: FAMILY MEDICINE CLINIC | Age: 71
End: 2021-04-08

## 2021-04-08 DIAGNOSIS — I10 ESSENTIAL HYPERTENSION: ICD-10-CM

## 2021-04-08 RX ORDER — LOSARTAN POTASSIUM AND HYDROCHLOROTHIAZIDE 12.5; 5 MG/1; MG/1
TABLET ORAL
Qty: 135 TABLET | Refills: 1 | Status: SHIPPED | OUTPATIENT
Start: 2021-04-08 | End: 2021-04-08 | Stop reason: SDUPTHER

## 2021-04-08 RX ORDER — LOSARTAN POTASSIUM AND HYDROCHLOROTHIAZIDE 12.5; 5 MG/1; MG/1
TABLET ORAL
Qty: 45 TABLET | Refills: 0 | Status: SHIPPED | OUTPATIENT
Start: 2021-04-08 | End: 2021-09-13

## 2021-07-07 ENCOUNTER — TELEPHONE (OUTPATIENT)
Dept: FAMILY MEDICINE CLINIC | Age: 71
End: 2021-07-07

## 2021-09-13 DIAGNOSIS — I10 ESSENTIAL HYPERTENSION: ICD-10-CM

## 2021-09-13 RX ORDER — LOSARTAN POTASSIUM AND HYDROCHLOROTHIAZIDE 12.5; 5 MG/1; MG/1
TABLET ORAL
Qty: 135 TABLET | Refills: 1 | Status: SHIPPED | OUTPATIENT
Start: 2021-09-13 | End: 2021-10-15 | Stop reason: SDUPTHER

## 2021-10-15 ENCOUNTER — TELEPHONE (OUTPATIENT)
Dept: FAMILY MEDICINE CLINIC | Age: 71
End: 2021-10-15

## 2021-10-15 DIAGNOSIS — I10 ESSENTIAL HYPERTENSION: ICD-10-CM

## 2021-10-15 RX ORDER — LOSARTAN POTASSIUM AND HYDROCHLOROTHIAZIDE 12.5; 5 MG/1; MG/1
TABLET ORAL
Qty: 135 TABLET | Refills: 1 | Status: SHIPPED | OUTPATIENT
Start: 2021-10-15 | End: 2022-04-01 | Stop reason: SDUPTHER

## 2021-10-15 NOTE — TELEPHONE ENCOUNTER
----- Message from Romel Whalen sent at 10/14/2021  2:53 PM EDT -----  Subject: Medication Problem    QUESTIONS  Name of Medication? losartan-hydroCHLOROthiazide (HYZAAR) 50-12.5 MG per   tablet  Patient-reported dosage and instructions? TAKE ONE AND ONE-HALF TABLETS   DAILY  What question or problem do you have with the medication? Patient stated   that [facility] told her that they no longer carry this medication and   will like for this medication to now be sent to [facility]. Patient stated   that she usually receives the 90 day supply. Preferred Pharmacy? [address] [place] [address] [phone]  Pharmacy phone number (if available)? [phone]  Additional Information for Provider?   ---------------------------------------------------------------------------  --------------  [address]  What is the best way for the office to contact you? OK to leave message on   voicemail  Preferred Call Back Phone Number? [phone]  ---------------------------------------------------------------------------  --------------  SCRIPT ANSWERS  Relationship to Patient?  Self

## 2021-10-15 NOTE — TELEPHONE ENCOUNTER
Lorraine Patterson called requesting a refill of the below medication which has been pended for you:     Requested Prescriptions     Pending Prescriptions Disp Refills    losartan-hydroCHLOROthiazide (HYZAAR) 50-12.5 MG per tablet 135 tablet 1     Sig: TAKE ONE AND ONE-HALF TABLETS DAILY       Last Appointment Date: 1/18/2021  Next Appointment Date: 1/18/2022    Allergies   Allergen Reactions    Darvocet A500 [Propoxyphene N-Acetaminophen]     Demadex [Torsemide]     Esomeprazole Magnesium     Lorazepam     Norvasc [Amlodipine]     Prochlorperazine Edisylate     Sulfa Antibiotics

## 2021-12-15 DIAGNOSIS — F41.1 GENERALIZED ANXIETY DISORDER: ICD-10-CM

## 2021-12-16 RX ORDER — CLORAZEPATE DIPOTASSIUM 7.5 MG/1
7.5 TABLET ORAL 2 TIMES DAILY PRN
Qty: 180 TABLET | Refills: 0 | Status: SHIPPED | OUTPATIENT
Start: 2021-12-16 | End: 2022-04-01 | Stop reason: SDUPTHER

## 2021-12-16 NOTE — TELEPHONE ENCOUNTER
Per [facility], last filled 7/9/2021, #180/90 days      Nathan Schilling called requesting a refill of the below medication which has been pended for you:     Requested Prescriptions     Pending Prescriptions Disp Refills    clorazepate (TRANXENE) 7.5 MG tablet 180 tablet 0     Sig: Take 1 tablet by mouth 2 times daily as needed for Anxiety or Sleep for up to 90 days.        Last Appointment Date: 1/18/2021  Next Appointment Date: 1/18/2022    Allergies   Allergen Reactions    Darvocet A500 [Propoxyphene N-Acetaminophen]     Demadex [Torsemide]     Esomeprazole Magnesium     Lorazepam     Norvasc [Amlodipine]     Prochlorperazine Edisylate     Sulfa Antibiotics

## 2022-03-14 DIAGNOSIS — I10 ESSENTIAL HYPERTENSION: ICD-10-CM

## 2022-03-15 RX ORDER — LOSARTAN POTASSIUM AND HYDROCHLOROTHIAZIDE 12.5; 5 MG/1; MG/1
TABLET ORAL
Qty: 135 TABLET | Refills: 1 | OUTPATIENT
Start: 2022-03-15

## 2022-04-01 ENCOUNTER — TELEMEDICINE (OUTPATIENT)
Dept: FAMILY MEDICINE CLINIC | Age: 72
End: 2022-04-01
Payer: MEDICARE

## 2022-04-01 DIAGNOSIS — R73.02 IMPAIRED GLUCOSE TOLERANCE: ICD-10-CM

## 2022-04-01 DIAGNOSIS — Z12.31 ENCOUNTER FOR SCREENING MAMMOGRAM FOR MALIGNANT NEOPLASM OF BREAST: Primary | ICD-10-CM

## 2022-04-01 DIAGNOSIS — L98.9 SKIN LESION OF FACE: ICD-10-CM

## 2022-04-01 DIAGNOSIS — E78.2 MIXED HYPERLIPIDEMIA: Primary | ICD-10-CM

## 2022-04-01 DIAGNOSIS — I10 ESSENTIAL HYPERTENSION: ICD-10-CM

## 2022-04-01 DIAGNOSIS — F41.1 GENERALIZED ANXIETY DISORDER: ICD-10-CM

## 2022-04-01 PROCEDURE — 99213 OFFICE O/P EST LOW 20 MIN: CPT | Performed by: FAMILY MEDICINE

## 2022-04-01 PROCEDURE — G8427 DOCREV CUR MEDS BY ELIG CLIN: HCPCS | Performed by: FAMILY MEDICINE

## 2022-04-01 PROCEDURE — G8400 PT W/DXA NO RESULTS DOC: HCPCS | Performed by: FAMILY MEDICINE

## 2022-04-01 PROCEDURE — 3017F COLORECTAL CA SCREEN DOC REV: CPT | Performed by: FAMILY MEDICINE

## 2022-04-01 PROCEDURE — 99212 OFFICE O/P EST SF 10 MIN: CPT

## 2022-04-01 PROCEDURE — 1090F PRES/ABSN URINE INCON ASSESS: CPT | Performed by: FAMILY MEDICINE

## 2022-04-01 PROCEDURE — 4040F PNEUMOC VAC/ADMIN/RCVD: CPT | Performed by: FAMILY MEDICINE

## 2022-04-01 PROCEDURE — 1123F ACP DISCUSS/DSCN MKR DOCD: CPT | Performed by: FAMILY MEDICINE

## 2022-04-01 RX ORDER — CLORAZEPATE DIPOTASSIUM 7.5 MG/1
7.5 TABLET ORAL 2 TIMES DAILY PRN
Qty: 180 TABLET | Refills: 0 | Status: SHIPPED | OUTPATIENT
Start: 2022-04-01 | End: 2022-06-30

## 2022-04-01 RX ORDER — LOSARTAN POTASSIUM AND HYDROCHLOROTHIAZIDE 12.5; 5 MG/1; MG/1
TABLET ORAL
Qty: 135 TABLET | Refills: 1 | Status: SHIPPED | OUTPATIENT
Start: 2022-04-01 | End: 2022-09-12

## 2022-04-01 ASSESSMENT — ENCOUNTER SYMPTOMS
ABDOMINAL PAIN: 0
COUGH: 0
WHEEZING: 0
CHEST TIGHTNESS: 0
SHORTNESS OF BREATH: 0

## 2022-04-01 ASSESSMENT — PATIENT HEALTH QUESTIONNAIRE - PHQ9
SUM OF ALL RESPONSES TO PHQ QUESTIONS 1-9: 0
SUM OF ALL RESPONSES TO PHQ QUESTIONS 1-9: 0
1. LITTLE INTEREST OR PLEASURE IN DOING THINGS: 0
SUM OF ALL RESPONSES TO PHQ QUESTIONS 1-9: 0
SUM OF ALL RESPONSES TO PHQ QUESTIONS 1-9: 0
SUM OF ALL RESPONSES TO PHQ9 QUESTIONS 1 & 2: 0
2. FEELING DOWN, DEPRESSED OR HOPELESS: 0

## 2022-04-01 NOTE — PROGRESS NOTES
2022    TELEHEALTH EVALUATION -- Audio/Visual (During PDLJX-32 public health emergency)    HPI: seen today video visit while she was at home and I was at work. Coleman Bedoya (: 1950) has requested an audio/video evaluation for the following concern(s):    HTN: stable; she has not had any issues with chest pain or shortness of breath. No issues with headaches. No issues with leg swelling. No issues iwht lightheadedness or dizziness upon standing. She is taking her medication regularly and not having any side effects from it. She is sleeping well with her medication. She is not having any trouble falling asleep or staying asleep. She is not getting groggy in the mornings and she is able to take care of multiple children each day without issue. She has a skin lesion on her chin that she is concerned about. She saw derm in [facility] and it was advised that she have it removed but it never was and she would like to have it looked at again. She also has a spot on her head that she would like removed as well. Review of Systems   Constitutional: Negative for activity change, appetite change, chills, fatigue and fever. Eyes: Negative for visual disturbance. Respiratory: Negative for cough, chest tightness, shortness of breath and wheezing. Cardiovascular: Negative for chest pain, palpitations and leg swelling. Gastrointestinal: Negative for abdominal pain. Genitourinary: Negative for difficulty urinating. Neurological: Negative for dizziness, syncope, weakness, light-headedness and headaches. Psychiatric/Behavioral: Negative for decreased concentration, dysphoric mood and sleep disturbance (she is sleeping well with her medications). The patient is not nervous/anxious. Prior to Visit Medications    Medication Sig Taking? Authorizing Provider   clorazepate (TRANXENE) 7.5 MG tablet Take 1 tablet by mouth 2 times daily as needed for Anxiety or Sleep for up to 90 days.  Yes Elisa Hagan Chi Bustamante MD   losartan-hydroCHLOROthiazide (HYZAAR) 50-12.5 MG per tablet TAKE ONE AND ONE-HALF TABLETS DAILY Yes Ana Bhardwaj MD   aspirin 81 MG tablet Take 81 mg by mouth daily. Yes Historical Provider, MD       Social History     Tobacco Use    Smoking status: Never Smoker    Smokeless tobacco: Never Used   Substance Use Topics    Alcohol use: No     Alcohol/week: 0.0 standard drinks    Drug use: No        Allergies   Allergen Reactions    Darvocet A500 [Propoxyphene N-Acetaminophen]     Demadex [Torsemide]     Esomeprazole Magnesium     Lorazepam     Norvasc [Amlodipine]     Prochlorperazine Edisylate     Sulfa Antibiotics    ,   Past Medical History:   Diagnosis Date    BCC (basal cell carcinoma of skin)     (sees dermatologist regularly).  Chronic insomnia     Claustrophobia     Generalized anxiety disorder     GERD (gastroesophageal reflux disease)     Hypertension     Impaired glucose tolerance     Mitral valve prolapse     Mixed hyperlipidemia     Myopia with astigmatism and presbyopia     bilateral    Osteoarthritis     Post herpetic neuralgia     shingles, thoracic    Ptosis     (right eye) - mild.     Pulmonary nodule     followed by pulmonology    Renal lithiasis     Unspecified vitamin D deficiency        PHYSICAL EXAMINATION:  [ INSTRUCTIONS:  \"[x]\" Indicates a positive item  \"[]\" Indicates a negative item  -- DELETE ALL ITEMS NOT EXAMINED]  Vital Signs: (As obtained by patient/caregiver or practitioner observation)    Patient-Reported Vitals 4/1/2022   Patient-Reported Weight 227# with home scale   Patient-Reported Systolic 946   Patient-Reported Diastolic 68   Patient-Reported Pulse 68          Constitutional: [x] Appears well-developed and well-nourished [x] No apparent distress      [] Abnormal-   Mental status  [x] Alert and awake  [x] Oriented to person/place/time [x]Able to follow commands      Eyes:  EOM    [x]  Normal  [] Abnormal-  Sclera  [x]  Normal  [] Abnormal -         Discharge [x]  None visible  [] Abnormal -    HENT:   [x] Normocephalic, atraumatic. [] Abnormal   [x] Mouth/Throat: Mucous membranes are moist.     External Ears [x] Normal  [] Abnormal-     Neck: [x] No visualized mass     Pulmonary/Chest: [x] Respiratory effort normal.  [x] No visualized signs of difficulty breathing or respiratory distress        [] Abnormal-      Musculoskeletal:   [x] Normal gait with no signs of ataxia         [x] Normal range of motion of neck        [] Abnormal-       Neurological:        [] No Facial Asymmetry (Cranial nerve 7 motor function) (limited exam to video visit)          [] No gaze palsy        [] Abnormal-         Skin:        [x] No significant exanthematous lesions or discoloration noted on facial skin         [] Abnormal-            Psychiatric:       [x] Normal Affect [x] No Hallucinations        [] Abnormal-     Other pertinent observable physical exam findings-     ASSESSMENT/PLAN:  1. Generalized anxiety disorder  Stable; she is doing well on the clorazepate and she is sleeping well with it so I will continue her current dose. - clorazepate (TRANXENE) 7.5 MG tablet; Take 1 tablet by mouth 2 times daily as needed for Anxiety or Sleep for up to 90 days. Dispense: 180 tablet; Refill: 0    2. Essential hypertension  Stable; her blood pressure is fantastic today which is likely because she is at home and not stressed about being in the office.     - losartan-hydroCHLOROthiazide (HYZAAR) 50-12.5 MG per tablet; TAKE ONE AND ONE-HALF TABLETS DAILY  Dispense: 135 tablet; Refill: 1  - Basic Metabolic Panel; Future    3. Mixed hyperlipidemia  - Lipid Panel; Future    4. Impaired glucose tolerance  - Hemoglobin A1C; Future    5. Skin lesion of face  Worsening; she is concerned about the spot on her chin and in her hair so I referred her to Dr. Ivania Stephenson since she does not want to have to drive to [place].      - Jarad Fisher MD, Dermatology, [place]      Return in about 3 months (around 7/1/2022) for [address], was evaluated through a synchronous (real-time) audio-video encounter. The patient (or guardian if applicable) is aware that this is a billable service, which includes applicable co-pays. This Virtual Visit was conducted with patient's (and/or legal guardian's) consent. The visit was conducted pursuant to the emergency declaration under the [address] [place] [address], [address] [place] [address] authority and the [place] [employer] Act. Patient identification was verified, and a caregiver was present when appropriate. The patient was located at home in a state where the provider was licensed to provide care. Total time spent on this encounter: Not billed by time    --Sumaya Lopez MD on 4/1/2022 at 12:24 PM    An electronic signature was used to authenticate this note.

## 2022-04-06 ENCOUNTER — HOSPITAL ENCOUNTER (OUTPATIENT)
Dept: MAMMOGRAPHY | Age: 72
Discharge: HOME | End: 2022-04-08
Payer: MEDICARE

## 2022-04-06 DIAGNOSIS — Z12.31 ENCOUNTER FOR SCREENING MAMMOGRAM FOR MALIGNANT NEOPLASM OF BREAST: ICD-10-CM

## 2022-04-06 PROCEDURE — 77063 BREAST TOMOSYNTHESIS BI: CPT

## 2022-05-25 ENCOUNTER — IMMUNIZATION (OUTPATIENT)
Dept: LAB | Age: 72
End: 2022-05-25
Payer: MEDICARE

## 2022-05-25 PROCEDURE — 91306 COVID-19, MODERNA BOOSTER VACCINE 0.25ML DOSE: CPT | Performed by: INTERNAL MEDICINE

## 2022-05-25 PROCEDURE — PBSHW COVID-19, MODERNA BOOSTER VACCINE 0.25ML DOSE: Performed by: INTERNAL MEDICINE

## 2022-06-23 ENCOUNTER — TELEPHONE (OUTPATIENT)
Dept: FAMILY MEDICINE CLINIC | Age: 72
End: 2022-06-23

## 2022-06-23 NOTE — TELEPHONE ENCOUNTER
Tried reaching out to patient to remind her of overdue labs before next appt.  On 7/11, phone number was of a busness    Extended till 7/11

## 2022-07-11 ENCOUNTER — OFFICE VISIT (OUTPATIENT)
Dept: FAMILY MEDICINE CLINIC | Age: 72
End: 2022-07-11
Payer: MEDICARE

## 2022-07-11 ENCOUNTER — HOSPITAL ENCOUNTER (OUTPATIENT)
Dept: LAB | Age: 72
Discharge: HOME | End: 2022-07-11
Payer: MEDICARE

## 2022-07-11 VITALS
DIASTOLIC BLOOD PRESSURE: 70 MMHG | HEIGHT: 66 IN | BODY MASS INDEX: 45 KG/M2 | SYSTOLIC BLOOD PRESSURE: 118 MMHG | OXYGEN SATURATION: 98 % | WEIGHT: 280 LBS | HEART RATE: 66 BPM | TEMPERATURE: 97.9 F

## 2022-07-11 DIAGNOSIS — R73.02 IMPAIRED GLUCOSE TOLERANCE: ICD-10-CM

## 2022-07-11 DIAGNOSIS — F41.1 GENERALIZED ANXIETY DISORDER: ICD-10-CM

## 2022-07-11 DIAGNOSIS — Z00.00 MEDICARE ANNUAL WELLNESS VISIT, SUBSEQUENT: Primary | ICD-10-CM

## 2022-07-11 DIAGNOSIS — I10 ESSENTIAL HYPERTENSION: ICD-10-CM

## 2022-07-11 DIAGNOSIS — I10 PRIMARY HYPERTENSION: ICD-10-CM

## 2022-07-11 DIAGNOSIS — E78.2 MIXED HYPERLIPIDEMIA: ICD-10-CM

## 2022-07-11 LAB
ANION GAP SERPL CALCULATED.3IONS-SCNC: 10 MMOL/L (ref 9–17)
BUN BLDV-MCNC: 25 MG/DL (ref 8–23)
BUN/CREAT BLD: 21 (ref 9–20)
CALCIUM SERPL-MCNC: 9.2 MG/DL (ref 8.6–10.4)
CHLORIDE BLD-SCNC: 102 MMOL/L (ref 98–107)
CHOLESTEROL/HDL RATIO: 5.1
CHOLESTEROL: 211 MG/DL
CO2: 27 MMOL/L (ref 20–31)
CREAT SERPL-MCNC: 1.21 MG/DL (ref 0.5–0.9)
GFR AFRICAN AMERICAN: 53 ML/MIN
GFR NON-AFRICAN AMERICAN: 44 ML/MIN
GFR SERPL CREATININE-BSD FRML MDRD: ABNORMAL ML/MIN/{1.73_M2}
GLUCOSE BLD-MCNC: 83 MG/DL (ref 70–99)
HDLC SERPL-MCNC: 41 MG/DL
LDL CHOLESTEROL: 125 MG/DL (ref 0–130)
POTASSIUM SERPL-SCNC: 3.9 MMOL/L (ref 3.7–5.3)
SODIUM BLD-SCNC: 139 MMOL/L (ref 135–144)
TRIGL SERPL-MCNC: 225 MG/DL

## 2022-07-11 PROCEDURE — G8417 CALC BMI ABV UP PARAM F/U: HCPCS | Performed by: FAMILY MEDICINE

## 2022-07-11 PROCEDURE — 1036F TOBACCO NON-USER: CPT | Performed by: FAMILY MEDICINE

## 2022-07-11 PROCEDURE — 36415 COLL VENOUS BLD VENIPUNCTURE: CPT

## 2022-07-11 PROCEDURE — 1123F ACP DISCUSS/DSCN MKR DOCD: CPT | Performed by: FAMILY MEDICINE

## 2022-07-11 PROCEDURE — G0439 PPPS, SUBSEQ VISIT: HCPCS | Performed by: FAMILY MEDICINE

## 2022-07-11 PROCEDURE — 80061 LIPID PANEL: CPT

## 2022-07-11 PROCEDURE — 80048 BASIC METABOLIC PNL TOTAL CA: CPT

## 2022-07-11 PROCEDURE — 3017F COLORECTAL CA SCREEN DOC REV: CPT | Performed by: FAMILY MEDICINE

## 2022-07-11 PROCEDURE — 1090F PRES/ABSN URINE INCON ASSESS: CPT | Performed by: FAMILY MEDICINE

## 2022-07-11 PROCEDURE — 83036 HEMOGLOBIN GLYCOSYLATED A1C: CPT

## 2022-07-11 PROCEDURE — 99214 OFFICE O/P EST MOD 30 MIN: CPT | Performed by: FAMILY MEDICINE

## 2022-07-11 PROCEDURE — G8400 PT W/DXA NO RESULTS DOC: HCPCS | Performed by: FAMILY MEDICINE

## 2022-07-11 PROCEDURE — G8427 DOCREV CUR MEDS BY ELIG CLIN: HCPCS | Performed by: FAMILY MEDICINE

## 2022-07-11 SDOH — ECONOMIC STABILITY: FOOD INSECURITY: WITHIN THE PAST 12 MONTHS, YOU WORRIED THAT YOUR FOOD WOULD RUN OUT BEFORE YOU GOT MONEY TO BUY MORE.: PATIENT DECLINED

## 2022-07-11 SDOH — ECONOMIC STABILITY: FOOD INSECURITY: WITHIN THE PAST 12 MONTHS, THE FOOD YOU BOUGHT JUST DIDN'T LAST AND YOU DIDN'T HAVE MONEY TO GET MORE.: PATIENT DECLINED

## 2022-07-11 ASSESSMENT — SOCIAL DETERMINANTS OF HEALTH (SDOH): HOW HARD IS IT FOR YOU TO PAY FOR THE VERY BASICS LIKE FOOD, HOUSING, MEDICAL CARE, AND HEATING?: PATIENT DECLINED

## 2022-07-11 ASSESSMENT — PATIENT HEALTH QUESTIONNAIRE - PHQ9
SUM OF ALL RESPONSES TO PHQ QUESTIONS 1-9: 0
2. FEELING DOWN, DEPRESSED OR HOPELESS: 0
SUM OF ALL RESPONSES TO PHQ QUESTIONS 1-9: 0
SUM OF ALL RESPONSES TO PHQ QUESTIONS 1-9: 0
SUM OF ALL RESPONSES TO PHQ9 QUESTIONS 1 & 2: 0
1. LITTLE INTEREST OR PLEASURE IN DOING THINGS: 0
SUM OF ALL RESPONSES TO PHQ QUESTIONS 1-9: 0

## 2022-07-11 ASSESSMENT — ENCOUNTER SYMPTOMS
SHORTNESS OF BREATH: 0
WHEEZING: 0
COUGH: 0
CHEST TIGHTNESS: 0

## 2022-07-11 ASSESSMENT — LIFESTYLE VARIABLES: HOW OFTEN DO YOU HAVE A DRINK CONTAINING ALCOHOL: NEVER

## 2022-07-11 NOTE — PATIENT INSTRUCTIONS
Personalized Preventive Plan for Paul Richardson - 7/11/2022  Medicare offers a range of preventive health benefits. Some of the tests and screenings are paid in full while other may be subject to a deductible, co-insurance, and/or copay. Some of these benefits include a comprehensive review of your medical history including lifestyle, illnesses that may run in your family, and various assessments and screenings as appropriate. After reviewing your medical record and screening and assessments performed today your provider may have ordered immunizations, labs, imaging, and/or referrals for you. A list of these orders (if applicable) as well as your Preventive Care list are included within your After Visit Summary for your review. Other Preventive Recommendations:    · A preventive eye exam performed by an eye specialist is recommended every 1-2 years to screen for glaucoma; cataracts, macular degeneration, and other eye disorders. · A preventive dental visit is recommended every 6 months. · Try to get at least 150 minutes of exercise per week or 10,000 steps per day on a pedometer . · Order or download the FREE \"Exercise & Physical Activity: Your Everyday Guide\" from [employer]. Call 6-376.531.6670 or search [employer] online. · You need 7306-5313 mg of calcium and 8054-0109 IU of vitamin D per day. It is possible to meet your calcium requirement with diet alone, but a vitamin D supplement is usually necessary to meet this goal.  · When exposed to the sun, use a sunscreen that protects against both UVA and UVB radiation with an SPF of 30 or greater. Reapply every 2 to 3 hours or after sweating, drying off with a towel, or swimming. · Always wear a seat belt when traveling in a car. Always wear a helmet when riding a bicycle or motorcycle.

## 2022-07-11 NOTE — PROGRESS NOTES
Medicare Annual Wellness Visit    Andreina Horne is here for Medicare AWV and Anxiety (3 month)    Assessment & Plan   Medicare annual wellness visit, subsequent  Primary hypertension  Generalized anxiety disorder      Recommendations for Preventive Services Due: see orders and patient instructions/AVS.  Recommended screening schedule for the next 5-10 years is provided to the patient in written form: see Patient Instructions/AVS.     Return for Medicare Annual Wellness Visit in 1 year. Subjective         Patient's complete Health Risk Assessment and screening values have been reviewed and are found in Flowsheets. The following problems were reviewed today and where indicated follow up appointments were made and/or referrals ordered.     Positive Risk Factor Screenings with Interventions:               General Health and ACP:  General  In general, how would you say your health is?: Very Good  In the past 7 days, have you experienced any of the following: New or Increased Pain, New or Increased Fatigue, Loneliness, Social Isolation, Stress or Anger?: No  Do you get the social and emotional support that you need?: Yes  Do you have a Living Will?: (!) No    Advance Directives     Power of  Living Will ACP-Advance Directive ACP-Power of     Not on File Not on File Not on File Not on File      General Health Risk Interventions:  · No Living Will: Patient declines ACP discussion/assistance    Health Habits/Nutrition:     Physical Activity: Sufficiently Active    Days of Exercise per Week: 7 days    Minutes of Exercise per Session: 50 min     Have you lost any weight without trying in the past 3 months?: (!) Yes  Body mass index: (!) 45.74  Have you seen the dentist within the past year?: Yes    Health Habits/Nutrition Interventions:  · Nutritional issues:  educational materials for healthy, well-balanced diet provided             Objective   Vitals:    07/11/22 1439   BP: 118/70   Pulse: 66   Temp: 97.9 °F (36.6 °C)   SpO2: 98%   Weight: 280 lb (127 kg)   Height: 5' 5.6\" (1.666 m)      Body mass index is 45.75 kg/m². Allergies   Allergen Reactions    Darvocet A500 [Propoxyphene N-Acetaminophen]     Demadex [Torsemide]     Esomeprazole Magnesium     Lorazepam     Norvasc [Amlodipine]     Prochlorperazine Edisylate     Sulfa Antibiotics      Prior to Visit Medications    Medication Sig Taking? Authorizing Provider   losartan-hydroCHLOROthiazide (HYZAAR) 50-12.5 MG per tablet TAKE ONE AND ONE-HALF TABLETS DAILY Yes Jose Maza MD   aspirin 81 MG tablet Take 81 mg by mouth daily. Yes Historical Provider, MD   clorazepate (TRANXENE) 7.5 MG tablet Take 1 tablet by mouth 2 times daily as needed for Anxiety or Sleep for up to 90 days.   Jose Maza MD       [facility] (Including outside providers/suppliers regularly involved in providing care):   Patient Care Team:  Jose Maza MD as PCP - General (Family Medicine)  Jose Maza MD as PCP - [address] [place] [address] Dr Fofana Provider     Reviewed and updated this visit:  Tobacco  Allergies  Meds  Problems  Med Hx  Surg Hx  Soc Hx  Fam Hx

## 2022-07-11 NOTE — PROGRESS NOTES
[facility] [address]  [address] [address]  Dept: [phone]  Dept Fax: [phone]  Loc: [phone]    Kate Fuchs is a 71 y.o. female who presents today for her medical conditions/complaints as noted below. [facility] Room is c/o of   Chief Complaint   Patient presents with    Medicare AWV    Anxiety     3 month       HPI:     HPI Here today for a follow up of her anxiety, HTN and for her [address]    She had multiple spots on her face and scalp and that were removed. Neither were melanoma. Her face healed up really well. She was really happy with Dr. Keith Davidson office. HTN: stable; her blood pressure is normal today. She has not had any issues with chest pain or shortness of breath. She is not having issues with regular headaches. No issues with leg swelling even on the hot days. She is not getting lightheaded or dizzy upon standing. Anxiety: stable; she has been doing well overall. Past Medical History:   Diagnosis Date    BCC (basal cell carcinoma of skin)     (sees dermatologist regularly).  Chronic insomnia     Claustrophobia     Generalized anxiety disorder     GERD (gastroesophageal reflux disease)     Hypertension     Impaired glucose tolerance     Mitral valve prolapse     Mixed hyperlipidemia     Myopia with astigmatism and presbyopia     bilateral    Osteoarthritis     Post herpetic neuralgia     shingles, thoracic    Ptosis     (right eye) - mild.     Pulmonary nodule     followed by pulmonology    Renal lithiasis     Unspecified vitamin D deficiency           Social History     Tobacco Use    Smoking status: Never Smoker    Smokeless tobacco: Never Used   Substance Use Topics    Alcohol use: No     Alcohol/week: 0.0 standard drinks     Current Outpatient Medications   Medication Sig Dispense Refill    losartan-hydroCHLOROthiazide (HYZAAR) 50-12.5 MG per tablet TAKE ONE AND ONE-HALF TABLETS DAILY 135 tablet 1    aspirin 81 MG tablet Take 81 mg by mouth daily.  clorazepate (TRANXENE) 7.5 MG tablet Take 1 tablet by mouth 2 times daily as needed for Anxiety or Sleep for up to 90 days. 180 tablet 0     No current facility-administered medications for this visit. Allergies   Allergen Reactions    Darvocet A500 [Propoxyphene N-Acetaminophen]     Demadex [Torsemide]     Esomeprazole Magnesium     Lorazepam     Norvasc [Amlodipine]     Prochlorperazine Edisylate     Sulfa Antibiotics        Subjective:     Review of Systems   Constitutional: Negative for activity change, appetite change, chills, fatigue and fever. Eyes: Negative for visual disturbance. Respiratory: Negative for cough, chest tightness, shortness of breath and wheezing. Cardiovascular: Negative for chest pain, palpitations and leg swelling. Genitourinary: Negative for difficulty urinating. Skin: Negative for rash. Neurological: Negative for dizziness, syncope, weakness, light-headedness and headaches. Psychiatric/Behavioral: Negative for decreased concentration, dysphoric mood and sleep disturbance. The patient is not nervous/anxious. Objective:      Physical Exam  Vitals and nursing note reviewed. Constitutional:       General: She is not in acute distress. Appearance: She is well-developed. HENT:      Right Ear: Tympanic membrane, ear canal and external ear normal.      Left Ear: Tympanic membrane, ear canal and external ear normal.   Eyes:      Conjunctiva/sclera: Conjunctivae normal.   Neck:      Thyroid: No thyromegaly. Cardiovascular:      Rate and Rhythm: Normal rate and regular rhythm. Heart sounds: Normal heart sounds. No murmur heard. Pulmonary:      Effort: Pulmonary effort is normal. No respiratory distress. Breath sounds: Normal breath sounds. No wheezing. Musculoskeletal:      Cervical back: Normal range of motion and neck supple. Lymphadenopathy:      Cervical: No cervical adenopathy. Skin:     General: Skin is warm and dry. Findings: No erythema or rash. Neurological:      Mental Status: She is alert and oriented to person, place, and time. Psychiatric:         Mood and Affect: Mood normal.         Behavior: Behavior normal.         Thought Content: Thought content normal.         Judgment: Judgment normal.       /70   Pulse 66   Temp 97.9 °F (36.6 °C)   Ht 5' 5.6\" (1.666 m)   Wt 280 lb (127 kg)   SpO2 98%   BMI 45.75 kg/m²     Assessment:       Diagnosis Orders   1. Medicare annual wellness visit, subsequent     2. Primary hypertension     3. Generalized anxiety disorder               Plan:        MWV: see other note    HTN: stable; she has been doing well overall. I will continue her current medication. VICKI: she has been doing well on her medication. I will continue her clorazepate. Return for Medicare Annual Wellness Visit in 1 year. Patientgiven educational materials - see patient instructions. Discussed use, benefit,and side effects of prescribed medications. All patient questions answered. Ptvoiced understanding. Reviewed health maintenance. Instructed to continue currentmedications, diet and exercise. Patient agreed with treatment plan. Follow up asdirected.      Electronically signed by Jaleesa Aguirre MD on 7/11/2022 at 3:15 PM

## 2022-07-12 LAB
ESTIMATED AVERAGE GLUCOSE: 126 MG/DL
HBA1C MFR BLD: 6 % (ref 4–6)

## 2022-09-12 DIAGNOSIS — I10 ESSENTIAL HYPERTENSION: ICD-10-CM

## 2022-09-12 RX ORDER — LOSARTAN POTASSIUM AND HYDROCHLOROTHIAZIDE 12.5; 5 MG/1; MG/1
TABLET ORAL
Qty: 135 TABLET | Refills: 1 | Status: SHIPPED | OUTPATIENT
Start: 2022-09-12

## 2022-09-12 NOTE — TELEPHONE ENCOUNTER
Cecelia President called requesting a refill of the below medication which has been pended for you:     Requested Prescriptions     Pending Prescriptions Disp Refills    losartan-hydroCHLOROthiazide (HYZAAR) 50-12.5 MG per tablet [Pharmacy Med Name: LOSARTAN/HCTZ TABS 50/12.5MG] 135 tablet 1     Sig: TAKE ONE AND ONE-HALF TABLETS DAILY       Last Appointment Date: 7/11/2022  Next Appointment Date: 7/13/2023    Allergies   Allergen Reactions    Darvocet A500 [Propoxyphene N-Acetaminophen]     Demadex [Torsemide]     Esomeprazole Magnesium     Lorazepam     Norvasc [Amlodipine]     Prochlorperazine Edisylate     Sulfa Antibiotics

## 2022-10-28 ENCOUNTER — IMMUNIZATION (OUTPATIENT)
Dept: LAB | Age: 72
End: 2022-10-28
Payer: MEDICARE

## 2022-10-28 PROCEDURE — PBSHW COVID-19, MODERNA BIVALENT BOOSTER, (AGE 12Y+), IM, 50 MCG/0.5 ML: Performed by: FAMILY MEDICINE

## 2022-10-28 PROCEDURE — 0134A COVID-19, MODERNA BIVALENT BOOSTER, (AGE 12Y+), IM, 50 MCG/0.5 ML: CPT | Performed by: FAMILY MEDICINE

## 2022-11-10 DIAGNOSIS — F41.1 GENERALIZED ANXIETY DISORDER: ICD-10-CM

## 2022-11-10 RX ORDER — CLORAZEPATE DIPOTASSIUM 7.5 MG/1
7.5 TABLET ORAL 2 TIMES DAILY PRN
Qty: 180 TABLET | Refills: 0 | Status: SHIPPED | OUTPATIENT
Start: 2022-11-10 | End: 2023-02-08

## 2022-11-10 NOTE — TELEPHONE ENCOUNTER
Yes, if you can have her see me virtually in the next month or two.  She only likes to do in person once a year

## 2022-11-10 NOTE — TELEPHONE ENCOUNTER
Pt is due for CSA. Would you like to see pt sooner than 7/13/2023? Per [facility], last filled 4/5/2022, #180/90 days      Guy Garcia called requesting a refill of the below medication which has been pended for you:     Requested Prescriptions     Pending Prescriptions Disp Refills    clorazepate (TRANXENE) 7.5 MG tablet 180 tablet 0     Sig: Take 1 tablet by mouth 2 times daily as needed for Anxiety or Sleep for up to 90 days.        Last Appointment Date: 7/11/2022  Next Appointment Date: 7/13/2023    Allergies   Allergen Reactions    Darvocet A500 [Propoxyphene N-Acetaminophen]     Demadex [Torsemide]     Esomeprazole Magnesium     Lorazepam     Norvasc [Amlodipine]     Prochlorperazine Edisylate     Sulfa Antibiotics

## 2022-11-10 NOTE — TELEPHONE ENCOUNTER
Left message informing pt due for CSA and 1-2 month follow up for controlled substance. Pt to call and schedule, virtually if would like.

## 2022-12-19 ENCOUNTER — OFFICE VISIT (OUTPATIENT)
Dept: FAMILY MEDICINE CLINIC | Age: 72
End: 2022-12-19
Payer: MEDICARE

## 2022-12-19 VITALS
DIASTOLIC BLOOD PRESSURE: 80 MMHG | BODY MASS INDEX: 45 KG/M2 | HEART RATE: 81 BPM | SYSTOLIC BLOOD PRESSURE: 110 MMHG | WEIGHT: 280 LBS | TEMPERATURE: 98.8 F | OXYGEN SATURATION: 96 % | HEIGHT: 66 IN

## 2022-12-19 DIAGNOSIS — R53.83 OTHER FATIGUE: ICD-10-CM

## 2022-12-19 DIAGNOSIS — J10.1 INFLUENZA A: Primary | ICD-10-CM

## 2022-12-19 DIAGNOSIS — Z20.828 EXPOSURE TO THE FLU: ICD-10-CM

## 2022-12-19 PROBLEM — N18.30 CHRONIC RENAL DISEASE, STAGE III (HCC): Status: ACTIVE | Noted: 2022-12-19

## 2022-12-19 LAB
INFLUENZA A ANTIBODY: POSITIVE
INFLUENZA B ANTIBODY: NEGATIVE

## 2022-12-19 PROCEDURE — PBSHW POCT INFLUENZA A/B: Performed by: FAMILY MEDICINE

## 2022-12-19 PROCEDURE — G8427 DOCREV CUR MEDS BY ELIG CLIN: HCPCS | Performed by: FAMILY MEDICINE

## 2022-12-19 PROCEDURE — G8484 FLU IMMUNIZE NO ADMIN: HCPCS | Performed by: FAMILY MEDICINE

## 2022-12-19 PROCEDURE — G8400 PT W/DXA NO RESULTS DOC: HCPCS | Performed by: FAMILY MEDICINE

## 2022-12-19 PROCEDURE — 1123F ACP DISCUSS/DSCN MKR DOCD: CPT | Performed by: FAMILY MEDICINE

## 2022-12-19 PROCEDURE — 3074F SYST BP LT 130 MM HG: CPT | Performed by: FAMILY MEDICINE

## 2022-12-19 PROCEDURE — 1036F TOBACCO NON-USER: CPT | Performed by: FAMILY MEDICINE

## 2022-12-19 PROCEDURE — 99213 OFFICE O/P EST LOW 20 MIN: CPT | Performed by: FAMILY MEDICINE

## 2022-12-19 PROCEDURE — G8417 CALC BMI ABV UP PARAM F/U: HCPCS | Performed by: FAMILY MEDICINE

## 2022-12-19 PROCEDURE — 87804 INFLUENZA ASSAY W/OPTIC: CPT | Performed by: FAMILY MEDICINE

## 2022-12-19 PROCEDURE — 1090F PRES/ABSN URINE INCON ASSESS: CPT | Performed by: FAMILY MEDICINE

## 2022-12-19 PROCEDURE — 3017F COLORECTAL CA SCREEN DOC REV: CPT | Performed by: FAMILY MEDICINE

## 2022-12-19 PROCEDURE — 3078F DIAST BP <80 MM HG: CPT | Performed by: FAMILY MEDICINE

## 2022-12-19 RX ORDER — DEXTROMETHORPHAN HYDROBROMIDE AND PROMETHAZINE HYDROCHLORIDE 15; 6.25 MG/5ML; MG/5ML
10 SYRUP ORAL 4 TIMES DAILY PRN
Qty: 118 ML | Refills: 0 | Status: SHIPPED | OUTPATIENT
Start: 2022-12-19 | End: 2022-12-26

## 2022-12-19 RX ORDER — BENZONATATE 100 MG/1
100 CAPSULE ORAL 3 TIMES DAILY PRN
Qty: 30 CAPSULE | Refills: 0 | Status: SHIPPED | OUTPATIENT
Start: 2022-12-19 | End: 2022-12-26

## 2022-12-19 ASSESSMENT — ENCOUNTER SYMPTOMS
CHEST TIGHTNESS: 0
WHEEZING: 0
SHORTNESS OF BREATH: 0
SINUS PRESSURE: 1
SORE THROAT: 0
TROUBLE SWALLOWING: 0
COUGH: 1
RHINORRHEA: 0
NAUSEA: 0
CHOKING: 0
CONSTIPATION: 0
DIARRHEA: 0

## 2022-12-19 NOTE — PROGRESS NOTES
[facility] [address] [place] [address]  Dept: [phone]  Dept Fax: [phone]  Loc: [phone]    Niraj Hernandez a 71 y.o. female who presents today for her medical conditions/complaints as notedbelow. Brannon Jesus is c/o of   Chief Complaint   Patient presents with    Cough     Exp to flu       HPI:     Here today for a cough    Cough  This is a new problem. The current episode started in the past 7 days (3 days). The cough is Productive of sputum. Associated symptoms include ear pain, headaches and myalgias. Pertinent negatives include no chest pain, chills, ear congestion, fever, nasal congestion, postnasal drip, rash, rhinorrhea, sore throat, shortness of breath or wheezing. The symptoms are aggravated by lying down. Treatments tried: nsaids, delsum, nyquil. The treatment provided no relief. There is no history of asthma or environmental allergies. She was exposed to her grandkids who have the flu    Past Medical History:   Diagnosis Date    BCC (basal cell carcinoma of skin)     (sees dermatologist regularly). Chronic insomnia     Claustrophobia     Generalized anxiety disorder     GERD (gastroesophageal reflux disease)     Hypertension     Impaired glucose tolerance     Mitral valve prolapse     Mixed hyperlipidemia     Myopia with astigmatism and presbyopia     bilateral    Osteoarthritis     Post herpetic neuralgia     shingles, thoracic    Ptosis     (right eye) - mild.     Pulmonary nodule     followed by pulmonology    Renal lithiasis     Unspecified vitamin D deficiency           Social History     Tobacco Use    Smoking status: Never    Smokeless tobacco: Never   Substance Use Topics    Alcohol use: No     Alcohol/week: 0.0 standard drinks     Current Outpatient Medications   Medication Sig Dispense Refill    benzonatate (TESSALON) 100 MG capsule Take 1 capsule by mouth 3 times daily as needed for Cough 30 capsule 0    promethazine-dextromethorphan (PROMETHAZINE-DM) 6.25-15 MG/5ML syrup Take 10 mLs by mouth 4 times daily as needed for Cough 118 mL 0    clorazepate (TRANXENE) 7.5 MG tablet Take 1 tablet by mouth 2 times daily as needed for Anxiety or Sleep for up to 90 days. 180 tablet 0    losartan-hydroCHLOROthiazide (HYZAAR) 50-12.5 MG per tablet TAKE ONE AND ONE-HALF TABLETS DAILY 135 tablet 1    aspirin 81 MG tablet Take 81 mg by mouth daily. No current facility-administered medications for this visit. Allergies   Allergen Reactions    Darvocet A500 [Propoxyphene N-Acetaminophen]     Demadex [Torsemide]     Esomeprazole Magnesium     Lorazepam     Norvasc [Amlodipine]     Prochlorperazine Edisylate     Sulfa Antibiotics        Subjective:     Review of Systems   Constitutional:  Positive for fatigue. Negative for activity change, appetite change, chills and fever. HENT:  Positive for congestion, ear pain, sinus pressure and sneezing. Negative for postnasal drip, rhinorrhea, sore throat and trouble swallowing. Eyes:  Negative for visual disturbance. Respiratory:  Positive for cough. Negative for choking, chest tightness, shortness of breath and wheezing. Cardiovascular:  Negative for chest pain, palpitations and leg swelling. Gastrointestinal:  Negative for constipation, diarrhea and nausea. Musculoskeletal:  Positive for myalgias. Skin:  Negative for rash. Allergic/Immunologic: Negative for environmental allergies. Neurological:  Positive for headaches. Objective:      Physical Exam  Vitals and nursing note reviewed. Constitutional:       General: She is not in acute distress. Appearance: She is well-developed. HENT:      Right Ear: Tympanic membrane, ear canal and external ear normal.      Left Ear: Tympanic membrane, ear canal and external ear normal.      Nose: Mucosal edema present.       Right Sinus: No maxillary sinus tenderness or frontal sinus tenderness. Left Sinus: No maxillary sinus tenderness or frontal sinus tenderness. Mouth/Throat:      Pharynx: No oropharyngeal exudate. Eyes:      Conjunctiva/sclera: Conjunctivae normal.   Cardiovascular:      Rate and Rhythm: Normal rate and regular rhythm. Heart sounds: Normal heart sounds. No murmur heard. Pulmonary:      Effort: Pulmonary effort is normal. No respiratory distress. Breath sounds: Normal breath sounds. No wheezing or rales. Musculoskeletal:      Cervical back: Normal range of motion and neck supple. Lymphadenopathy:      Cervical: No cervical adenopathy. Skin:     General: Skin is warm and dry. Findings: No rash. Neurological:      Mental Status: She is alert and oriented to person, place, and time. Psychiatric:         Behavior: Behavior normal.         Judgment: Judgment normal.     /80   Pulse 81   Temp 98.8 °F (37.1 °C)   Ht 5' 5.6\" (1.666 m)   Wt 280 lb (127 kg)   SpO2 96%   BMI 45.75 kg/m²     Assessment:       Diagnosis Orders   1. Influenza A        2. Other fatigue  POCT Influenza A/B      3. Exposure to the flu  POCT Influenza A/B      4. Body mass index (BMI) 45.0-49.9, adult ([facility])                  Plan:       Influenza a: she has a viral respiratory infection so I recommended that she use mucinex to help with congestion and cough. I also recommended Flonase and an antihistamine for sinus symptoms. she was also encouraged to use tylenol or ibuprofen for fever. I sent in tessalon and cough syrup for her cough. she was instructed to return if there is no improvement or symptoms worsen. Return if symptoms worsen or fail to improve.     Orders Placed This Encounter   Procedures    POCT Influenza A/B     FLU A/B     Orders Placed This Encounter   Medications    benzonatate (TESSALON) 100 MG capsule     Sig: Take 1 capsule by mouth 3 times daily as needed for Cough     Dispense:  30 capsule     Refill:  0 promethazine-dextromethorphan (PROMETHAZINE-DM) 6.25-15 MG/5ML syrup     Sig: Take 10 mLs by mouth 4 times daily as needed for Cough     Dispense:  118 mL     Refill:  0         Patientgiven educational materials - see patient instructions. Discussed use, benefit,and side effects of prescribed medications. All patient questions answered. Ptvoiced understanding. Reviewed health maintenance. Instructed to continue currentmedications, diet and exercise. Patient agreed with treatment plan. Follow up asdirected.      Electronically signed by Yancy Chávez MD on 12/19/2022 at 5:19 PM

## 2023-01-04 DIAGNOSIS — I10 ESSENTIAL HYPERTENSION: ICD-10-CM

## 2023-01-04 NOTE — TELEPHONE ENCOUNTER
Bernadette Coronel called requesting a refill of the below medication which has been pended for you:     Requested Prescriptions     Pending Prescriptions Disp Refills    losartan-hydroCHLOROthiazide (HYZAAR) 50-12.5 MG per tablet 135 tablet 1     Sig: TAKE ONE AND ONE-HALF TABLETS DAILY       Last Appointment Date: 12/19/2022  Next Appointment Date: 7/13/2023    Allergies   Allergen Reactions    Darvocet A500 [Propoxyphene N-Acetaminophen]     Demadex [Torsemide]     Esomeprazole Magnesium     Lorazepam     Norvasc [Amlodipine]     Prochlorperazine Edisylate     Sulfa Antibiotics

## 2023-01-05 RX ORDER — LOSARTAN POTASSIUM AND HYDROCHLOROTHIAZIDE 12.5; 5 MG/1; MG/1
TABLET ORAL
Qty: 135 TABLET | Refills: 1 | Status: SHIPPED | OUTPATIENT
Start: 2023-01-05

## 2023-01-18 DIAGNOSIS — F41.1 GENERALIZED ANXIETY DISORDER: ICD-10-CM

## 2023-01-18 RX ORDER — CLORAZEPATE DIPOTASSIUM 7.5 MG/1
7.5 TABLET ORAL 2 TIMES DAILY PRN
Qty: 180 TABLET | Refills: 0 | Status: SHIPPED | OUTPATIENT
Start: 2023-01-18 | End: 2023-04-18

## 2023-01-18 NOTE — TELEPHONE ENCOUNTER
Arnie Amaral called requesting a refill of the below medication which has been pended for you: per GENE, last clorazepate fill 11/15/22 #180-  ? Not due until 2/13/23? New pharmacy. Requested Prescriptions     Pending Prescriptions Disp Refills    clorazepate (TRANXENE) 7.5 MG tablet 180 tablet 0     Sig: Take 1 tablet by mouth 2 times daily as needed for Anxiety or Sleep for up to 90 days.        Last Appointment Date: 12/19/2022  Next Appointment Date: 7/13/2023    Allergies   Allergen Reactions    Darvocet A500 [Propoxyphene N-Acetaminophen]     Demadex [Torsemide]     Esomeprazole Magnesium     Lorazepam     Norvasc [Amlodipine]     Prochlorperazine Edisylate     Sulfa Antibiotics

## 2023-04-15 NOTE — PROGRESS NOTES
Subjective   Patient ID: Mukesh is a 19 year old male who is accompanied by:mother     Chief Complaint   Patient presents with   • Office Visit       Here today to discuss problems with possible low energy, extra sleeping, mom wondering about blood work for these things.   Looking pretty pale in the summer.   Micha disagrees with mom about needing to come here.   He reports taking his Lexapro like he is supposed to.  Mom is not sure this is helping a lot, and is trying to figure out if he is actually depressed or just more introverted.   Generally comes home from school and goes to his room and disappears - mom not sure if sleeping or not.   He denies going to his room as she says, he says he is doing other things.  He also does not think the medication helps.   He does not want to do any counseling.  He will be going to [employer] next year.   Mom is wanting to do one last visit here due to these concerns as the family is losing their insurance.    Review of Systems   All other systems reviewed and are negative.      Patient's medications, allergies, past medical, surgical, social and family histories were reviewed and updated as appropriate.    Objective   Vitals:Pulse 84   Temp 97.6 °F (36.4 °C) (Temporal)   Resp 16   Wt 70.5 kg (155 lb 8 oz)   Physical Exam  Vitals reviewed.   Constitutional:       Appearance: Normal appearance.   HENT:      Head: Normocephalic.      Neck: Normal range of motion and neck supple.   Eyes:      Conjunctiva/sclera: Conjunctivae normal.   Cardiovascular:      Rate and Rhythm: Normal rate and regular rhythm.      Heart sounds: Normal heart sounds. No murmur heard.  Pulmonary:      Effort: Pulmonary effort is normal.      Breath sounds: Normal breath sounds.   Abdominal:      General: Abdomen is flat.      Palpations: Abdomen is soft. There is no mass.      Tenderness: There is no abdominal tenderness.   Lymphadenopathy:      Cervical: No cervical adenopathy.   Neurological:       Mental Status: He is alert.         Assessment   Problem List Items Addressed This Visit    None  Visit Diagnoses     Malaise and fatigue    -  Primary    Relevant Orders    Vitamin D -25 Hydroxy    CBC with Automated Differential        Labs ordered per above.  Advised that likely these will be normal given screening last year.  Dom states he likely will not get them done given that he does not believe they are necessary.     Encouraged them to continue discussion at home about his behavior.   Negative for cough, chest tightness, shortness of breath and wheezing. Cardiovascular: Negative for chest pain, palpitations and leg swelling. Gastrointestinal: Negative for abdominal pain, constipation and diarrhea. Endocrine: Negative for polydipsia, polyphagia and polyuria. Genitourinary: Negative for difficulty urinating. Musculoskeletal: Positive for arthralgias (right knee pain. she saw an orthopedic doctor). Negative for back pain and myalgias. Skin: Negative for rash. Allergic/Immunologic: Negative for environmental allergies. Neurological: Negative for dizziness, syncope, weakness, light-headedness and headaches. Psychiatric/Behavioral: Negative for dysphoric mood and sleep disturbance. The patient is not nervous/anxious. Based upon direct observation of the patient, evaluation of cognition reveals recent and remote memory intact. General Appearance: alert and oriented to person, place and time, well-developed and well-nourished, in no acute distress  Skin: warm and dry, no rash or erythema  Head: normocephalic and atraumatic  Eyes: pupils equal, round, and reactive to light, extraocular eye movements intact, conjunctivae normal  ENT: tympanic membrane, external ear and ear canal normal bilaterally, oropharynx clear and moist with normal mucous membranes  Neck: neck supple and non tender without mass, no thyromegaly or thyroid nodules, no cervical lymphadenopathy   Pulmonary/Chest: clear to auscultation bilaterally- no wheezes, rales or rhonchi, normal air movement, no respiratory distress  Cardiovascular: normal rate, normal S1 and S2, no gallops and intact distal pulses  Extremities: no cyanosis, no clubbing and no edema  Musculoskeletal: normal range of motion, no joint swelling, deformity or tenderness  Neurologic: gait and coordination normal and speech normal    Patient's complete Health Risk Assessment and screening values have been reviewed and are found in Flowsheets. The following problems were reviewed today and where indicated follow up appointments were made and/or referrals ordered. Positive Risk Factor Screenings with Interventions:     General Health:  General  In general, how would you say your health is?: Excellent  In the past 7 days, have you experienced any of the following? New or Increased Pain, New or Increased Fatigue, Loneliness, Social Isolation, Stress or Anger?: None of These  Do you get the social and emotional support that you need?: Yes  Do you have a Living Will?: (!) No  General Health Risk Interventions:  · No Living Will: additional information provided . Health Habits/Nutrition:  Health Habits/Nutrition  Do you exercise for at least 20 minutes 2-3 times per week?: Yes  Do you eat fewer than 2 meals per day?: No  Have you seen a dentist within the past year?: Yes  Body mass index is 40.92 kg/m².   Health Habits/Nutrition Interventions:  · none needed    Personalized Preventive Plan   Current Health Maintenance Status  Immunization History   Administered Date(s) Administered    Influenza A ([id number]) Vaccine PF IM 01/21/2010    Influenza Vaccine, unspecified formulation 09/16/2016    Influenza Virus Vaccine 01/10/2013, 10/05/2015    Influenza Whole 01/10/2013    Influenza, High Dose (Fluzone 65 yrs and older) 09/16/2016, 11/30/2017, 09/27/2018    Influenza, Intradermal, Preservative free 10/14/2013, 10/10/2014    Influenza, Triv, inactivated, subunit, adjuvanted, IM (Fluad 65 yrs and older) 10/14/2019    Pneumococcal Conjugate 13-valent ([id number]) 10/05/2015    Pneumococcal Polysaccharide ([id number]) 03/24/2017    Zoster Live (Zostavax) 12/30/2013        Health Maintenance   Topic Date Due    DTaP/Tdap/Td vaccine (1 - Tdap) 04/03/1962    Colon cancer screen colonoscopy  04/03/2001    Shingles Vaccine (2 of 3) 02/24/2014    DEXA (modify frequency per FRAX score)  04/03/2016    A1C test (Diabetic or Prediabetic)  09/14/2017    Annual no

## 2023-04-29 ENCOUNTER — HOSPITAL ENCOUNTER (OUTPATIENT)
Dept: MAMMOGRAPHY | Age: 73
End: 2023-04-29
Payer: MEDICARE

## 2023-04-29 DIAGNOSIS — Z12.31 ENCOUNTER FOR SCREENING MAMMOGRAM FOR MALIGNANT NEOPLASM OF BREAST: ICD-10-CM

## 2023-04-29 PROCEDURE — 77063 BREAST TOMOSYNTHESIS BI: CPT

## 2023-05-29 DIAGNOSIS — I10 ESSENTIAL HYPERTENSION: ICD-10-CM

## 2023-05-30 RX ORDER — LOSARTAN POTASSIUM AND HYDROCHLOROTHIAZIDE 12.5; 5 MG/1; MG/1
TABLET ORAL
Qty: 135 TABLET | Refills: 0 | Status: SHIPPED | OUTPATIENT
Start: 2023-05-30

## 2023-05-30 NOTE — TELEPHONE ENCOUNTER
Polly Cornejo called requesting a refill of the below medication which has been pended for you:     Requested Prescriptions     Pending Prescriptions Disp Refills    losartan-hydroCHLOROthiazide (HYZAAR) 50-12.5 MG per tablet [Pharmacy Med Name: Losartan Potassium-HCTZ 50-12.5 MG Oral Tablet] 135 tablet 0     Sig: TAKE 1 AND 1/2 TABLETS BY MOUTH  DAILY       Last Appointment Date: 12/19/2022  Next Appointment Date: 7/13/2023    Allergies   Allergen Reactions    Darvocet A500 [Propoxyphene N-Acetaminophen]     Demadex [Torsemide]     Esomeprazole Magnesium     Lorazepam     Norvasc [Amlodipine]     Prochlorperazine Edisylate     Sulfa Antibiotics

## 2023-07-24 ENCOUNTER — PATIENT MESSAGE (OUTPATIENT)
Dept: FAMILY MEDICINE CLINIC | Age: 73
End: 2023-07-24

## 2023-07-24 DIAGNOSIS — R10.30 LOWER ABDOMINAL PAIN: ICD-10-CM

## 2023-07-24 DIAGNOSIS — K63.9 BOWEL WALL THICKENING: Primary | ICD-10-CM

## 2023-07-24 DIAGNOSIS — K57.92 DIVERTICULITIS: ICD-10-CM

## 2023-07-25 NOTE — TELEPHONE ENCOUNTER
From: Immanuel Oro  To: Dr. Melanie Huitron: 7/24/2023 2:27 PM EDT  Subject: visit with PA on July 20th. -     Andres Villarreal, I am writing to you through my chart because I cannot get through to you. I was having severe stomach pain and I went to ER. They did a scan on me and said that one of my kidney's was enlarged but no stones. I then went to a PA urologist on July 20th. She as set up with another urologist on September 11th. the first I could get in for another test. She also told me to tell you to be sure and look at the scan because it also showed some abnormality in my bowel. Could you please call me and advise where I should go from here or if I should wait for the Sept. 11 th. appointment. I will wait to hear from you. I do have an appointment with you on the 9th, of Sept, but could you refer me to Dr. Genet Aguilar to get the ball rolling for a colonoscopy. thanks and I will wait to hear from you or a call.  Sincerely, Sharmila Erazo

## 2023-08-23 ENCOUNTER — INITIAL CONSULT (OUTPATIENT)
Dept: SURGERY | Age: 73
End: 2023-08-23
Payer: MEDICARE

## 2023-08-23 ENCOUNTER — TELEPHONE (OUTPATIENT)
Dept: SURGERY | Age: 73
End: 2023-08-23

## 2023-08-23 VITALS
RESPIRATION RATE: 14 BRPM | DIASTOLIC BLOOD PRESSURE: 86 MMHG | TEMPERATURE: 97 F | BODY MASS INDEX: 39.41 KG/M2 | HEIGHT: 66 IN | WEIGHT: 245.2 LBS | SYSTOLIC BLOOD PRESSURE: 136 MMHG | HEART RATE: 72 BPM

## 2023-08-23 DIAGNOSIS — E66.01 OBESITY, CLASS III, BMI 40-49.9 (MORBID OBESITY) (HCC): ICD-10-CM

## 2023-08-23 DIAGNOSIS — K57.92 DIVERTICULITIS: Primary | ICD-10-CM

## 2023-08-23 DIAGNOSIS — I10 ESSENTIAL HYPERTENSION: ICD-10-CM

## 2023-08-23 PROCEDURE — 1090F PRES/ABSN URINE INCON ASSESS: CPT | Performed by: SURGERY

## 2023-08-23 PROCEDURE — 3017F COLORECTAL CA SCREEN DOC REV: CPT | Performed by: SURGERY

## 2023-08-23 PROCEDURE — G8427 DOCREV CUR MEDS BY ELIG CLIN: HCPCS | Performed by: SURGERY

## 2023-08-23 PROCEDURE — 3075F SYST BP GE 130 - 139MM HG: CPT | Performed by: SURGERY

## 2023-08-23 PROCEDURE — 1123F ACP DISCUSS/DSCN MKR DOCD: CPT | Performed by: SURGERY

## 2023-08-23 PROCEDURE — G8417 CALC BMI ABV UP PARAM F/U: HCPCS | Performed by: SURGERY

## 2023-08-23 PROCEDURE — 1036F TOBACCO NON-USER: CPT | Performed by: SURGERY

## 2023-08-23 PROCEDURE — 3079F DIAST BP 80-89 MM HG: CPT | Performed by: SURGERY

## 2023-08-23 PROCEDURE — 99214 OFFICE O/P EST MOD 30 MIN: CPT | Performed by: SURGERY

## 2023-08-23 PROCEDURE — G8400 PT W/DXA NO RESULTS DOC: HCPCS | Performed by: SURGERY

## 2023-08-23 RX ORDER — LOSARTAN POTASSIUM AND HYDROCHLOROTHIAZIDE 12.5; 5 MG/1; MG/1
TABLET ORAL
Qty: 135 TABLET | Refills: 0 | Status: SHIPPED | OUTPATIENT
Start: 2023-08-23

## 2023-08-23 NOTE — TELEPHONE ENCOUNTER
Archana Suárez called requesting a refill of the below medication which has been pended for you:     Requested Prescriptions     Pending Prescriptions Disp Refills    losartan-hydroCHLOROthiazide (HYZAAR) 50-12.5 MG per tablet [Pharmacy Med Name: Losartan Potassium-HCTZ 50-12.5 MG Oral Tablet] 135 tablet 0     Sig: TAKE 1 AND 1/2 TABLETS BY MOUTH  DAILY       Last Appointment Date: 12/19/2022  Next Appointment Date: 10/17/2023    Allergies   Allergen Reactions    Darvocet A500 [Propoxyphene N-Acetaminophen]     Demadex [Torsemide]     Esomeprazole Magnesium     Lorazepam     Norvasc [Amlodipine]     Prochlorperazine Edisylate     Sulfa Antibiotics

## 2023-08-23 NOTE — TELEPHONE ENCOUNTER
[facility]         Patient:Terri Mario           :1950           Surgical/Procedure Planned: Colonoscopy    Date & Location: 2023 at [facility]       Outpatient   Planned Length of OR: 30 min    Sedation: intravenous sedation      Estimated Cardiac Risk for Non-Cardiac Surgery/Procedure     Low______ Moderate______ High______    Medication Instructions - Clarification needed by this date:       ASA 81 mg Hold ___ Days      Resume medications:     Lovenox indicated: _____Yes _____NO    Provider:Dr. Awanda Claude of Provider Giving Orders for Medication holds:    _____________________________________________

## 2023-09-22 ENCOUNTER — TELEPHONE (OUTPATIENT)
Dept: SURGERY | Age: 73
End: 2023-09-22

## 2023-09-22 NOTE — TELEPHONE ENCOUNTER
Letter created and mailed to patient with results from recent colonoscopy at [facility] with Dr. Jose Gordillo on 9-8-23. Updated history, health maintenance, and recall. Forwarded results to PCP.

## 2023-09-22 NOTE — TELEPHONE ENCOUNTER
Letter created and mailed to patient with results from recent colonoscopy at [facility] with Dr. Nesha Meier on 9-8-23. Updated history, health maintenance, and recall. Forwarded results to PCP.

## 2023-10-17 ENCOUNTER — OFFICE VISIT (OUTPATIENT)
Dept: FAMILY MEDICINE CLINIC | Age: 73
End: 2023-10-17
Payer: MEDICARE

## 2023-10-17 ENCOUNTER — HOSPITAL ENCOUNTER (OUTPATIENT)
Age: 73
Discharge: HOME | End: 2023-10-17
Payer: MEDICARE

## 2023-10-17 VITALS
WEIGHT: 249.9 LBS | SYSTOLIC BLOOD PRESSURE: 130 MMHG | OXYGEN SATURATION: 97 % | RESPIRATION RATE: 12 BRPM | BODY MASS INDEX: 40.16 KG/M2 | HEIGHT: 66 IN | HEART RATE: 72 BPM | DIASTOLIC BLOOD PRESSURE: 84 MMHG

## 2023-10-17 DIAGNOSIS — E78.2 MIXED HYPERLIPIDEMIA: ICD-10-CM

## 2023-10-17 DIAGNOSIS — R73.02 IMPAIRED GLUCOSE TOLERANCE: ICD-10-CM

## 2023-10-17 DIAGNOSIS — N13.30 HYDRONEPHROSIS, UNSPECIFIED HYDRONEPHROSIS TYPE: ICD-10-CM

## 2023-10-17 DIAGNOSIS — I10 PRIMARY HYPERTENSION: ICD-10-CM

## 2023-10-17 DIAGNOSIS — F41.1 GENERALIZED ANXIETY DISORDER: ICD-10-CM

## 2023-10-17 DIAGNOSIS — Z00.00 MEDICARE ANNUAL WELLNESS VISIT, SUBSEQUENT: Primary | ICD-10-CM

## 2023-10-17 LAB
ANION GAP SERPL CALCULATED.3IONS-SCNC: 8 MMOL/L (ref 9–17)
BUN SERPL-MCNC: 22 MG/DL (ref 8–23)
BUN/CREAT SERPL: 17 (ref 9–20)
CALCIUM SERPL-MCNC: 9.2 MG/DL (ref 8.6–10.4)
CHLORIDE SERPL-SCNC: 104 MMOL/L (ref 98–107)
CHOLEST SERPL-MCNC: 194 MG/DL
CHOLESTEROL/HDL RATIO: 5.2
CO2 SERPL-SCNC: 29 MMOL/L (ref 20–31)
CREAT SERPL-MCNC: 1.3 MG/DL (ref 0.5–0.9)
GFR SERPL CREATININE-BSD FRML MDRD: 44 ML/MIN/1.73M2
GLUCOSE SERPL-MCNC: 117 MG/DL (ref 70–99)
HDLC SERPL-MCNC: 37 MG/DL
LDLC SERPL CALC-MCNC: 100 MG/DL (ref 0–130)
POTASSIUM SERPL-SCNC: 3.8 MMOL/L (ref 3.7–5.3)
SODIUM SERPL-SCNC: 141 MMOL/L (ref 135–144)
TRIGL SERPL-MCNC: 285 MG/DL

## 2023-10-17 PROCEDURE — G0439 PPPS, SUBSEQ VISIT: HCPCS | Performed by: FAMILY MEDICINE

## 2023-10-17 PROCEDURE — 1090F PRES/ABSN URINE INCON ASSESS: CPT | Performed by: FAMILY MEDICINE

## 2023-10-17 PROCEDURE — 1123F ACP DISCUSS/DSCN MKR DOCD: CPT | Performed by: FAMILY MEDICINE

## 2023-10-17 PROCEDURE — 80061 LIPID PANEL: CPT

## 2023-10-17 PROCEDURE — 99213 OFFICE O/P EST LOW 20 MIN: CPT | Performed by: FAMILY MEDICINE

## 2023-10-17 PROCEDURE — 3079F DIAST BP 80-89 MM HG: CPT | Performed by: FAMILY MEDICINE

## 2023-10-17 PROCEDURE — G8484 FLU IMMUNIZE NO ADMIN: HCPCS | Performed by: FAMILY MEDICINE

## 2023-10-17 PROCEDURE — 80048 BASIC METABOLIC PNL TOTAL CA: CPT

## 2023-10-17 PROCEDURE — G8417 CALC BMI ABV UP PARAM F/U: HCPCS | Performed by: FAMILY MEDICINE

## 2023-10-17 PROCEDURE — G8400 PT W/DXA NO RESULTS DOC: HCPCS | Performed by: FAMILY MEDICINE

## 2023-10-17 PROCEDURE — 83036 HEMOGLOBIN GLYCOSYLATED A1C: CPT

## 2023-10-17 PROCEDURE — 3017F COLORECTAL CA SCREEN DOC REV: CPT | Performed by: FAMILY MEDICINE

## 2023-10-17 PROCEDURE — 3075F SYST BP GE 130 - 139MM HG: CPT | Performed by: FAMILY MEDICINE

## 2023-10-17 PROCEDURE — G8427 DOCREV CUR MEDS BY ELIG CLIN: HCPCS | Performed by: FAMILY MEDICINE

## 2023-10-17 PROCEDURE — 36415 COLL VENOUS BLD VENIPUNCTURE: CPT

## 2023-10-17 PROCEDURE — 1036F TOBACCO NON-USER: CPT | Performed by: FAMILY MEDICINE

## 2023-10-17 RX ORDER — FLUTICASONE PROPIONATE 50 MCG
2 SPRAY, SUSPENSION (ML) NASAL 2 TIMES DAILY
Qty: 16 G | Refills: 0 | Status: SHIPPED | OUTPATIENT
Start: 2023-10-17

## 2023-10-17 SDOH — ECONOMIC STABILITY: FOOD INSECURITY: WITHIN THE PAST 12 MONTHS, THE FOOD YOU BOUGHT JUST DIDN'T LAST AND YOU DIDN'T HAVE MONEY TO GET MORE.: NEVER TRUE

## 2023-10-17 SDOH — ECONOMIC STABILITY: FOOD INSECURITY: WITHIN THE PAST 12 MONTHS, YOU WORRIED THAT YOUR FOOD WOULD RUN OUT BEFORE YOU GOT MONEY TO BUY MORE.: NEVER TRUE

## 2023-10-17 SDOH — ECONOMIC STABILITY: HOUSING INSECURITY
IN THE LAST 12 MONTHS, WAS THERE A TIME WHEN YOU DID NOT HAVE A STEADY PLACE TO SLEEP OR SLEPT IN A SHELTER (INCLUDING NOW)?: NO

## 2023-10-17 SDOH — ECONOMIC STABILITY: INCOME INSECURITY: HOW HARD IS IT FOR YOU TO PAY FOR THE VERY BASICS LIKE FOOD, HOUSING, MEDICAL CARE, AND HEATING?: NOT HARD AT ALL

## 2023-10-17 ASSESSMENT — LIFESTYLE VARIABLES
HOW MANY STANDARD DRINKS CONTAINING ALCOHOL DO YOU HAVE ON A TYPICAL DAY: PATIENT DOES NOT DRINK
HOW OFTEN DO YOU HAVE A DRINK CONTAINING ALCOHOL: NEVER

## 2023-10-17 ASSESSMENT — ENCOUNTER SYMPTOMS
COUGH: 0
BACK PAIN: 0
CONSTIPATION: 0
CHEST TIGHTNESS: 0
ABDOMINAL PAIN: 0
WHEEZING: 0
DIARRHEA: 0
SHORTNESS OF BREATH: 0

## 2023-10-17 ASSESSMENT — PATIENT HEALTH QUESTIONNAIRE - PHQ9
1. LITTLE INTEREST OR PLEASURE IN DOING THINGS: 0
2. FEELING DOWN, DEPRESSED OR HOPELESS: 0
SUM OF ALL RESPONSES TO PHQ QUESTIONS 1-9: 0
SUM OF ALL RESPONSES TO PHQ9 QUESTIONS 1 & 2: 0

## 2023-10-17 NOTE — PROGRESS NOTES
[address]  [address] [address]  Dept: [phone]  Dept Fax: [phone]  Loc: [phone]    Isaiah Boone is a 68 y.o. female who presents today for her medical conditions/complaints as noted below. Isaiah Boone is c/o of   Chief Complaint   Patient presents with    Medicare AWV     And yearly appointment. Patient would like her ears checked today. HPI:     HPI Here today for her [address] and a follow up of her HTN. HTN: stable; she has been doing well. She has not had any issues with chest pain or shortness of breath. No issues with regular basis. She is sleeping well. No issues with leg swelling. No lightheadedness or dizziness upon standing. She is using a chemo cream on her hands and face recently. She has been doing well with her anxiety. She is taking the clorazepate on occasion. She has been having some problems with her right kidney. She has hydronephrosis on the right side and it is only functioning at 17%. She is considering a nephrectomy in the future but she is rechecking again in 6 months. Past Medical History:   Diagnosis Date    BCC (basal cell carcinoma of skin)     (sees dermatologist regularly). Chronic insomnia     Claustrophobia     Generalized anxiety disorder     GERD (gastroesophageal reflux disease)     Hypertension     Impaired glucose tolerance     Mitral valve prolapse     Mixed hyperlipidemia     Myopia with astigmatism and presbyopia     bilateral    Osteoarthritis     Post herpetic neuralgia     shingles, thoracic    Ptosis     (right eye) - mild.     Pulmonary nodule     followed by pulmonology    Renal lithiasis     Unspecified vitamin D deficiency           Social History     Tobacco Use    Smoking status: Never    Smokeless tobacco: Never   Substance Use Topics    Alcohol use: No     Alcohol/week: 0.0 standard drinks of alcohol     Current

## 2023-10-17 NOTE — PROGRESS NOTES
Patient will get the flu and covid vaccines at a later date. Patient will get the second shingrix, the Tdap vaccine she will get at a later date as well.

## 2023-10-17 NOTE — PATIENT INSTRUCTIONS
Advance Directives: Care Instructions  Overview  An advance directive is a legal way to state your wishes at the end of your life. It tells your family and your doctor what to do if you can't say what you want. There are two main types of advance directives. You can change them any time your wishes change. Living will. This form tells your family and your doctor your wishes about life support and other treatment. The form is also called a declaration. Medical power of . This form lets you name a person to make treatment decisions for you when you can't speak for yourself. This person is called a health care agent (health care proxy, health care surrogate). The form is also called a durable power of  for health care. If you do not have an advance directive, decisions about your medical care may be made by a family member, or by a doctor or a  who doesn't know you. It may help to think of an advance directive as a gift to the people who care for you. If you have one, they won't have to make tough decisions by themselves. For more information, including forms for your state, see the [address] [place] [address] website (www.caringinfo.org/planning/advance-directives/). Follow-up care is a key part of your treatment and safety. Be sure to make and go to all appointments, and call your doctor if you are having problems. It's also a good idea to know your test results and keep a list of the medicines you take. What should you include in an advance directive? Many states have a unique advance directive form. (It may ask you to address specific issues.) Or you might use a universal form that's approved by many states. If your form doesn't tell you what to address, it may be hard to know what to include in your advance directive. Use the questions below to help you get started. Who do you want to make decisions about your medical care if you are not able to?   What life-support measures do you want if you

## 2023-10-18 LAB
EST. AVERAGE GLUCOSE BLD GHB EST-MCNC: 117 MG/DL
HBA1C MFR BLD: 5.7 % (ref 4–6)

## 2023-12-05 ENCOUNTER — CARE COORDINATION (OUTPATIENT)
Dept: CARE COORDINATION | Age: 73
End: 2023-12-05

## 2023-12-05 DIAGNOSIS — I10 ESSENTIAL HYPERTENSION: ICD-10-CM

## 2023-12-05 DIAGNOSIS — F41.1 GENERALIZED ANXIETY DISORDER: ICD-10-CM

## 2023-12-05 RX ORDER — LOSARTAN POTASSIUM AND HYDROCHLOROTHIAZIDE 12.5; 5 MG/1; MG/1
TABLET ORAL
Qty: 135 TABLET | Refills: 3 | Status: SHIPPED | OUTPATIENT
Start: 2023-12-05

## 2023-12-05 RX ORDER — CLORAZEPATE DIPOTASSIUM 7.5 MG/1
TABLET ORAL
Qty: 180 TABLET | Refills: 0 | Status: SHIPPED | OUTPATIENT
Start: 2023-12-05 | End: 2024-03-04

## 2023-12-05 NOTE — CARE COORDINATION
Ambulatory Care Coordination  ED Follow up Call    Iris Stephens was seen at [facility] ED 12/4/2023. She was given medications at discharge and will  her prescriptions today. Reviewed prescriptions and she voiced understanding. She stated she and spouse both have bronchitis. She stated she has had for several weeks. She is unchanged. Encouraged to stay hydrated- drink fluids, she is able to eat. Deep breath each hour encouraged and she voiced understanding. We reviewed Urgent Care/Walk In care. She declined further calls but will reach out to this writer if with concerns. Reason for ED visit:  Bronchitis   Status:     not changed    Did you call your PCP prior to going to the ED? Yes      Did you receive a discharge instructions from the Emergency Room? Yes  Review of Instructions:     Understands what to report/when to return?:  Yes   Understands discharge instructions?:  Yes   Following discharge instructions?:  Yes       Are there any new complaints of pain? No  New Pain Meds? N/A    Constipation prophylaxis needed? N/A    If you have a wound is the dressing clean, dry, and intact? N/A  Understands wound care regimen? N/A    Are there any other complaints/concerns that you wish to tell your provider?    No    FU appts/Provider:    Future Appointments   Date Time Provider [address]   10/18/2024 10:20 AM Yary Mehta MD [facility]     New Medications?:   Yes

## 2023-12-05 NOTE — TELEPHONE ENCOUNTER
Per [facility], last fill 01/19/23, quantity 180 for 90 days.    Aldo Pabon called requesting a refill of the below medication which has been pended for you:     Requested Prescriptions     Pending Prescriptions Disp Refills    clorazepate (TRANXENE) 7.5 MG tablet [Pharmacy Med Name: CLORAZEPATE  7.5MG  TAB  DIPOT] 180 tablet 0     Sig: TAKE 1 TABLET BY MOUTH TWICE  DAILY AS NEEDED FOR ANXIETY OR  SLEEP    losartan-hydroCHLOROthiazide (HYZAAR) 50-12.5 MG per tablet [Pharmacy Med Name: Losartan Potassium-HCTZ 50-12.5 MG Oral Tablet] 135 tablet 3     Sig: TAKE 1 AND 1/2 TABLETS BY MOUTH  DAILY       Last Appointment Date: 10/17/2023  Next Appointment Date: 10/18/2024    Allergies   Allergen Reactions    Darvocet A500 [Propoxyphene N-Acetaminophen]     Demadex [Torsemide]     Esomeprazole Magnesium     Lorazepam     Norvasc [Amlodipine]     Prochlorperazine Edisylate     Sulfa Antibiotics

## 2023-12-13 ENCOUNTER — PATIENT MESSAGE (OUTPATIENT)
Dept: FAMILY MEDICINE CLINIC | Age: 73
End: 2023-12-13

## 2023-12-13 RX ORDER — LOSARTAN POTASSIUM AND HYDROCHLOROTHIAZIDE 25; 100 MG/1; MG/1
1 TABLET ORAL DAILY
Qty: 90 TABLET | Refills: 1
Start: 2023-12-13

## 2023-12-13 NOTE — TELEPHONE ENCOUNTER
From: Nika Martin  To: Dr. Johnson Madi: 12/13/2023 8:16 AM EST  Subject: concerns    Good Morning    I am writing on my chart to inform that I have increased my blood pressure meds to two pills today My blood pressure has been way out of range for about 3 or 4 weeks and keeps getting worse The ER dr told me to increase to two [id number] if it continued I was to follow up with you the past 2 urgent care and ER visit but cannot get past your  She keeps telling me to go to urgent care that you are backed up two months So just in case my charts need to be read by another Dr. I wanted it to be charted the increase today I have been taking 1 1/2 for two weeks and didn't bring it down I will be seeing   a kidney specialist tomorrow I did go back to urgent care after our last chat on my chart and my ear was very infected and fluid on the left ear This all ran into bronchitis for the last two months So thank you for charting this just in case I need further information

## 2023-12-13 NOTE — TELEPHONE ENCOUNTER
Dr Keerthi Whitten I loaded the correct dose for patients Losartan/HCTZ 100/25  as an adjust sig if you are ok with this dosage please sign.

## 2024-01-12 ENCOUNTER — TELEPHONE (OUTPATIENT)
Dept: FAMILY MEDICINE CLINIC | Age: 74
End: 2024-01-12

## 2024-01-12 DIAGNOSIS — F41.1 GENERALIZED ANXIETY DISORDER: ICD-10-CM

## 2024-01-12 RX ORDER — CLORAZEPATE DIPOTASSIUM 7.5 MG/1
7.5 TABLET ORAL NIGHTLY
Qty: 10 TABLET | Refills: 0 | Status: SHIPPED | OUTPATIENT
Start: 2024-01-12 | End: 2024-01-12 | Stop reason: SDUPTHER

## 2024-01-12 RX ORDER — CLORAZEPATE DIPOTASSIUM 7.5 MG/1
7.5 TABLET ORAL NIGHTLY
Qty: 10 TABLET | Refills: 0 | Status: SHIPPED | OUTPATIENT
Start: 2024-01-12 | End: 2024-01-22

## 2024-01-13 NOTE — TELEPHONE ENCOUNTER
Pt's daughter, Crystal, calling this evening - pt was just at [facility] overnight and had procedure for ureteral stent placement.  They just returned home this evening.  While at the hospital, pt's Tranxene was taken to pharmacy, as it was a medication they did not keep in stock, and upon discharge today, it was not given back to pt.  They just realized when they got home this evening that the bottle was not in their belongings, and [facility] stated it was still in the pharmacy there and she could come back and pick it up.  Pt and family are not wishing to travel back to [facility] due to bad weather.  Pt's daughter is requesting a short-term supply to be sent in until they can get back to [facility] pharmacy in the next week.  Confirmed per pt's daughter and Epic that pt has been on this med chronically.    10-day supply sent to [facility] pharmacy with a note to allow to fill due to lost Rx.      Controlled Substance Monitoring:    Acute and Chronic Pain Monitoring:   RX Monitoring Periodic Controlled Substance Monitoring   1/12/2024   7:13 PM No signs of potential drug abuse or diversion identified.

## 2024-02-01 ENCOUNTER — TELEPHONE (OUTPATIENT)
Dept: FAMILY MEDICINE CLINIC | Age: 74
End: 2024-02-01

## 2024-02-01 DIAGNOSIS — F41.1 GENERALIZED ANXIETY DISORDER: ICD-10-CM

## 2024-02-01 RX ORDER — CLORAZEPATE DIPOTASSIUM 7.5 MG/1
7.5 TABLET ORAL NIGHTLY
Qty: 90 TABLET | Refills: 0 | Status: SHIPPED | OUTPATIENT
Start: 2024-02-01 | End: 2024-05-01

## 2024-02-01 NOTE — TELEPHONE ENCOUNTER
Patient was recently in [facility] due to right hydronephrosis due to obstruction. Right renal stent placed but right kidney is still not functioning appropriately. Seen urology today and they were going to speak with nephrology regarding possibly removing her right kidney.     Patient did not take the Clorazepate while in the hospital and even a few nights at home and was able to sleep ok however 5 days later without medication she is now having major withdrawal issues states she can not sleep, has chest pain, breaking out in sweats, and mind is racing. She was told it is because of withdrawal from the medication. With everything that is going on currently patient doesn't think she can go without this mediation. Can she please restart med?

## 2024-02-01 NOTE — TELEPHONE ENCOUNTER
----- Message from Charlotte Wright sent at 2/1/2024 10:19 AM EST -----  Subject: Message to Provider    QUESTIONS  Information for Provider? Pt is calling in requesting PCP to call her   asap. States she has some questions about a medication. Please advise.   ---------------------------------------------------------------------------  --------------  CALL BACK INFO  [phone]; Do not leave any message, patient will call back for answer  ---------------------------------------------------------------------------  --------------  SCRIPT ANSWERS  Relationship to Patient? Self

## 2024-08-07 DIAGNOSIS — F41.1 GENERALIZED ANXIETY DISORDER: ICD-10-CM

## 2024-08-08 RX ORDER — CLORAZEPATE DIPOTASSIUM 7.5 MG/1
TABLET ORAL
Qty: 180 TABLET | Refills: 0 | Status: SHIPPED | OUTPATIENT
Start: 2024-08-08 | End: 2024-11-06

## 2024-08-08 RX ORDER — CLORAZEPATE DIPOTASSIUM 7.5 MG/1
7.5 TABLET ORAL NIGHTLY
Qty: 12 TABLET | Refills: 0 | Status: SHIPPED | OUTPATIENT
Start: 2024-08-08 | End: 2024-08-20

## 2024-08-08 NOTE — TELEPHONE ENCOUNTER
Terri called requesting a refill of the below medication which has been pended for you:   Per [facility] last fill date was 12/7/2023 quantity 180 for 90 days and 1/13/2024 quantity 10 for 10 days   Patient is wanting a short supply until mail order comes  Requested Prescriptions     Pending Prescriptions Disp Refills    clorazepate (TRANXENE) 7.5 MG tablet 12 tablet 0     Sig: Take 1 tablet by mouth nightly for 12 days. Max Daily Amount: 7.5 mg       Last Appointment Date: 10/17/2023  Next Appointment Date: 8/8/2024    Allergies   Allergen Reactions    Darvocet A500 [Propoxyphene N-Acetaminophen]     Demadex [Torsemide]     Esomeprazole Magnesium     Lorazepam     Norvasc [Amlodipine]     Prochlorperazine Edisylate     Sulfa Antibiotics

## 2024-08-08 NOTE — TELEPHONE ENCOUNTER
Pt requesting this script be a 2 wk or 1 mo supply locally because she will be out before mail order arrives.

## 2024-08-08 NOTE — TELEPHONE ENCOUNTER
Terri called requesting a refill of the below medication which has been pended for you:   Per [facility] last fill date was 12/7/2023 quantity 180 for 90 days and 1/13/2024 quantity 10 for 10 days   Requested Prescriptions     Pending Prescriptions Disp Refills    clorazepate (TRANXENE) 7.5 MG tablet [Pharmacy Med Name: CLORAZEPATE  7.5MG  TAB  DIPOT] 180 tablet 0     Sig: TAKE 1 TABLET BY MOUTH TWICE  DAILY AS NEEDED FOR ANXIETY OR  SLEEP       Last Appointment Date: 10/17/2023  Next Appointment Date: 8/7/2024    Allergies   Allergen Reactions    Darvocet A500 [Propoxyphene N-Acetaminophen]     Demadex [Torsemide]     Esomeprazole Magnesium     Lorazepam     Norvasc [Amlodipine]     Prochlorperazine Edisylate     Sulfa Antibiotics

## 2024-08-09 RX ORDER — LOSARTAN POTASSIUM AND HYDROCHLOROTHIAZIDE 25; 100 MG/1; MG/1
1 TABLET ORAL DAILY
Qty: 90 TABLET | Refills: 1 | Status: SHIPPED | OUTPATIENT
Start: 2024-08-09

## 2024-08-09 NOTE — TELEPHONE ENCOUNTER
Terri called requesting a refill of the below medication which has been pended for you:     Requested Prescriptions     Pending Prescriptions Disp Refills    losartan-hydroCHLOROthiazide (HYZAAR) 100-25 MG per tablet 90 tablet 1     Sig: Take 1 tablet by mouth daily       Last Appointment Date: 10/17/2023  Next Appointment Date: 10/18/2024    Allergies   Allergen Reactions    Darvocet A500 [Propoxyphene N-Acetaminophen]     Demadex [Torsemide]     Esomeprazole Magnesium     Lorazepam     Norvasc [Amlodipine]     Prochlorperazine Edisylate     Sulfa Antibiotics

## 2024-09-17 ENCOUNTER — TELEPHONE (OUTPATIENT)
Dept: FAMILY MEDICINE CLINIC | Age: 74
End: 2024-09-17

## 2024-09-17 DIAGNOSIS — Z12.31 VISIT FOR SCREENING MAMMOGRAM: Primary | ICD-10-CM

## 2024-10-12 ENCOUNTER — HOSPITAL ENCOUNTER (OUTPATIENT)
Dept: MAMMOGRAPHY | Age: 74
Discharge: HOME | End: 2024-10-14
Payer: MEDICARE

## 2024-10-12 VITALS — HEIGHT: 66 IN | WEIGHT: 237 LBS | BODY MASS INDEX: 38.09 KG/M2

## 2024-10-12 DIAGNOSIS — Z12.31 VISIT FOR SCREENING MAMMOGRAM: ICD-10-CM

## 2024-10-12 PROCEDURE — 77063 BREAST TOMOSYNTHESIS BI: CPT

## 2024-10-14 ENCOUNTER — PATIENT MESSAGE (OUTPATIENT)
Dept: FAMILY MEDICINE CLINIC | Age: 74
End: 2024-10-14

## 2024-10-18 ENCOUNTER — OFFICE VISIT (OUTPATIENT)
Dept: FAMILY MEDICINE CLINIC | Age: 74
End: 2024-10-18
Payer: MEDICARE

## 2024-10-18 VITALS
WEIGHT: 248.5 LBS | HEIGHT: 66 IN | OXYGEN SATURATION: 97 % | DIASTOLIC BLOOD PRESSURE: 82 MMHG | SYSTOLIC BLOOD PRESSURE: 132 MMHG | HEART RATE: 72 BPM | BODY MASS INDEX: 39.94 KG/M2

## 2024-10-18 DIAGNOSIS — F41.1 GENERALIZED ANXIETY DISORDER: ICD-10-CM

## 2024-10-18 DIAGNOSIS — R73.02 IMPAIRED GLUCOSE TOLERANCE: ICD-10-CM

## 2024-10-18 DIAGNOSIS — Z00.00 MEDICARE ANNUAL WELLNESS VISIT, SUBSEQUENT: Primary | ICD-10-CM

## 2024-10-18 DIAGNOSIS — N18.30 STAGE 3 CHRONIC KIDNEY DISEASE, UNSPECIFIED WHETHER STAGE 3A OR 3B CKD (HCC): ICD-10-CM

## 2024-10-18 PROCEDURE — 3017F COLORECTAL CA SCREEN DOC REV: CPT | Performed by: FAMILY MEDICINE

## 2024-10-18 PROCEDURE — 3079F DIAST BP 80-89 MM HG: CPT | Performed by: FAMILY MEDICINE

## 2024-10-18 PROCEDURE — G0439 PPPS, SUBSEQ VISIT: HCPCS | Performed by: FAMILY MEDICINE

## 2024-10-18 PROCEDURE — G8484 FLU IMMUNIZE NO ADMIN: HCPCS | Performed by: FAMILY MEDICINE

## 2024-10-18 PROCEDURE — 1123F ACP DISCUSS/DSCN MKR DOCD: CPT | Performed by: FAMILY MEDICINE

## 2024-10-18 PROCEDURE — 3075F SYST BP GE 130 - 139MM HG: CPT | Performed by: FAMILY MEDICINE

## 2024-10-18 RX ORDER — CLORAZEPATE DIPOTASSIUM 7.5 MG/1
7.5 TABLET ORAL 2 TIMES DAILY PRN
Qty: 180 TABLET | Refills: 0 | Status: SHIPPED | OUTPATIENT
Start: 2024-10-18 | End: 2025-01-16

## 2024-10-18 RX ORDER — CARVEDILOL 6.25 MG/1
6.25 TABLET ORAL 2 TIMES DAILY
COMMUNITY

## 2024-10-18 RX ORDER — LOSARTAN POTASSIUM 100 MG/1
100 TABLET ORAL DAILY
COMMUNITY
Start: 2024-09-17 | End: 2025-09-12

## 2024-10-18 SDOH — ECONOMIC STABILITY: FOOD INSECURITY: WITHIN THE PAST 12 MONTHS, THE FOOD YOU BOUGHT JUST DIDN'T LAST AND YOU DIDN'T HAVE MONEY TO GET MORE.: NEVER TRUE

## 2024-10-18 SDOH — ECONOMIC STABILITY: FOOD INSECURITY: WITHIN THE PAST 12 MONTHS, YOU WORRIED THAT YOUR FOOD WOULD RUN OUT BEFORE YOU GOT MONEY TO BUY MORE.: NEVER TRUE

## 2024-10-18 SDOH — ECONOMIC STABILITY: INCOME INSECURITY: HOW HARD IS IT FOR YOU TO PAY FOR THE VERY BASICS LIKE FOOD, HOUSING, MEDICAL CARE, AND HEATING?: NOT HARD AT ALL

## 2024-10-18 ASSESSMENT — PATIENT HEALTH QUESTIONNAIRE - PHQ9
SUM OF ALL RESPONSES TO PHQ QUESTIONS 1-9: 0
SUM OF ALL RESPONSES TO PHQ QUESTIONS 1-9: 0
1. LITTLE INTEREST OR PLEASURE IN DOING THINGS: NOT AT ALL
2. FEELING DOWN, DEPRESSED OR HOPELESS: NOT AT ALL
SUM OF ALL RESPONSES TO PHQ QUESTIONS 1-9: 0
SUM OF ALL RESPONSES TO PHQ QUESTIONS 1-9: 0
SUM OF ALL RESPONSES TO PHQ9 QUESTIONS 1 & 2: 0

## 2024-10-18 ASSESSMENT — ENCOUNTER SYMPTOMS
CONSTIPATION: 0
CHEST TIGHTNESS: 0
BLOOD IN STOOL: 0
WHEEZING: 0
DIARRHEA: 0
SHORTNESS OF BREATH: 0
ABDOMINAL PAIN: 0
COUGH: 0
NAUSEA: 0

## 2024-10-18 ASSESSMENT — LIFESTYLE VARIABLES
HOW OFTEN DO YOU HAVE A DRINK CONTAINING ALCOHOL: NEVER
HOW MANY STANDARD DRINKS CONTAINING ALCOHOL DO YOU HAVE ON A TYPICAL DAY: PATIENT DOES NOT DRINK

## 2024-10-18 NOTE — PROGRESS NOTES
Medicare Annual Wellness Visit    Terri Mario is here for Medicare AWV    Assessment & Plan   Medicare annual wellness visit, subsequent  Stage 3 chronic kidney disease, unspecified whether stage 3a or 3b CKD (HCC)  Generalized anxiety disorder  -     clorazepate (TRANXENE) 7.5 MG tablet; Take 1 tablet by mouth 2 times daily as needed for Anxiety or Sleep for up to 90 days. Max Daily Amount: 15 mg, Disp-180 tablet, R-0Normal  Impaired glucose tolerance  -     Hemoglobin A1C; Future    Recommendations for Preventive Services Due: see orders and patient instructions/AVS.  Recommended screening schedule for the next 5-10 years is provided to the patient in written form: see Patient Instructions/AVS.     Return in about 1 year (around 10/18/2025) for MWV.     Subjective   The following acute and/or chronic problems were also addressed today:  Anxiety, htn    Patient's complete Health Risk Assessment and screening values have been reviewed and are found in Flowsheets. The following problems were reviewed today and where indicated follow up appointments were made and/or referrals ordered.    Positive Risk Factor Screenings with Interventions:                  Abnormal BMI (obese):  Body mass index is 40.72 kg/m². (!) Abnormal  Interventions:  low carbohydrate diet           Advanced Directives:  Do you have a Living Will?: (!) No    Intervention:  has NO advanced directive - not interested in additional information               Objective   Vitals:    10/18/24 1036   BP: 132/82   Site: Left Upper Arm   Position: Sitting   Cuff Size: Large Adult   Pulse: 72   SpO2: 97%   Weight: 112.7 kg (248 lb 8 oz)   Height: 1.664 m (5' 5.5\")      Body mass index is 40.72 kg/m².                  Allergies   Allergen Reactions    Darvocet A500 [Propoxyphene N-Acetaminophen]     Demadex [Torsemide]     Esomeprazole Magnesium     Lorazepam     Norvasc [Amlodipine]     Prochlorperazine Edisylate     Sulfa Antibiotics      Prior to Visit

## 2024-10-18 NOTE — PROGRESS NOTES
[facility]  [address] [facility] [address]  Dept: [phone]  Dept Fax: [phone]  Loc: [phone]    Terri Mario  is a 74 y.o. female who presents today for her medical conditions/complaints as noted below.  Terri Mario is c/o of   Chief Complaint   Patient presents with    Medicare AWV       HPI:     History of Present Illness  The patient is a 74-year-old female who is here for her well visit for follow-up of her hypertension, follow-up of her acid reflux, and her anxiety.    She reports overall good health, with no recent UTIs or bladder infections. Regular visits to Dr. Roman (nephrology) are part of her ongoing care. Her blood pressure is well-controlled and she is satisfied with this adjustment. She is currently on Losartan/HCTZ and carvedilol 6.25 mg bid. She attempted to discontinue her tranxene for 2.5 weeks, but experienced severe panic attacks, difficulty breathing, and palpitations. Her anxiety has improved, and she no longer experiences palpitations. Her sleep is satisfactory, but she reports a lack of energy. She denies any pain in her shoulders or knees, chest pain, shortness of breath, or leg swelling. She denies any abdominal issues, constipation, diarrhea, or blood in her stool. She has undergone a mammogram and colonoscopy. She previously took morphine for pain. She does not have a living will.    Supplemental Information  She has 2 skin cancers.    IMMUNIZATIONS  She has received her influenza and COVID-19 vaccines. She has received 2 shingles vaccines.      Past Medical History:   Diagnosis Date    BCC (basal cell carcinoma of skin)     (sees dermatologist regularly).    Chronic insomnia     Claustrophobia     Generalized anxiety disorder     GERD (gastroesophageal reflux disease)     Hypertension     Impaired glucose tolerance     Mitral valve prolapse     Mixed hyperlipidemia

## 2024-10-18 NOTE — PATIENT INSTRUCTIONS
disease, occurs when a substance called plaque builds up in the vessels that supply oxygen-rich blood to your heart muscle. This can narrow the blood vessels and reduce blood flow. A heart attack happens when blood flow is completely blocked. A high-fat diet, smoking, and other factors increase the risk of heart disease.  Your doctor has found that you have a chance of having heart disease. A heart-healthy lifestyle can help keep your heart healthy and prevent heart disease. This lifestyle includes eating healthy, being active, staying at a weight that's healthy for you, and not smoking or using tobacco. It also includes taking medicines as directed, managing other health conditions, and trying to get a healthy amount of sleep.  Follow-up care is a key part of your treatment and safety. Be sure to make and go to all appointments, and call your doctor if you are having problems. It's also a good idea to know your test results and keep a list of the medicines you take.  How can you care for yourself at home?  Diet    Use less salt when you cook and eat. This helps lower your blood pressure. Taste food before salting. Add only a little salt when you think you need it. With time, your taste buds will adjust to less salt.     Eat fewer snack items, fast foods, canned soups, and other high-salt, high-fat, processed foods.     Read food labels and try to avoid saturated and trans fats. They increase your risk of heart disease by raising cholesterol levels.     Limit the amount of solid fat--butter, margarine, and shortening--you eat. Use olive, peanut, or canola oil when you cook. Bake, broil, and steam foods instead of frying them.     Eat a variety of fruit and vegetables every day. Dark green, deep orange, red, or yellow fruits and vegetables are especially good for you. Examples include spinach, carrots, peaches, and berries.     Foods high in fiber can reduce your cholesterol and provide important vitamins and

## 2024-10-22 NOTE — TELEPHONE ENCOUNTER
Wilmar Gibson called requesting a refill of the below medication which has been pended for you:     Requested Prescriptions     Pending Prescriptions Disp Refills    losartan-hydroCHLOROthiazide (HYZAAR) 50-12.5 MG per tablet [Pharmacy Med Name: LOSARTAN/HCTZ TABS 50/12.5MG] 135 tablet 1     Sig: TAKE ONE AND ONE-HALF TABLETS DAILY       Last Appointment Date: 1/18/2021  Next Appointment Date: 1/18/2022    Allergies   Allergen Reactions    Darvocet A500 [Propoxyphene N-Acetaminophen]     Demadex [Torsemide]     Esomeprazole Magnesium     Lorazepam     Norvasc [Amlodipine]     Prochlorperazine Edisylate     Sulfa Antibiotics
No

## 2025-01-15 DIAGNOSIS — F41.1 GENERALIZED ANXIETY DISORDER: ICD-10-CM

## 2025-01-15 RX ORDER — CLORAZEPATE DIPOTASSIUM 7.5 MG/1
7.5 TABLET ORAL 2 TIMES DAILY PRN
Qty: 180 TABLET | Refills: 0 | Status: SHIPPED | OUTPATIENT
Start: 2025-01-15 | End: 2025-04-15

## 2025-01-15 NOTE — TELEPHONE ENCOUNTER
Per [facility], last fill 10/24/2024, quantity 180 for 90 days.   Terri called requesting a refill of the below medication which has been pended for you: [facility]     Requested Prescriptions     Pending Prescriptions Disp Refills    clorazepate (TRANXENE) 7.5 MG tablet 180 tablet 0     Sig: Take 1 tablet by mouth 2 times daily as needed for Anxiety or Sleep for up to 90 days. Max Daily Amount: 15 mg       Last Appointment Date: 10/18/2024  Next Appointment Date: 10/20/2025    Allergies   Allergen Reactions    Darvocet A500 [Propoxyphene N-Acetaminophen]     Demadex [Torsemide]     Esomeprazole Magnesium     Lorazepam     Norvasc [Amlodipine]     Prochlorperazine Edisylate     Sulfa Antibiotics

## 2025-03-21 NOTE — PROGRESS NOTES
Nick Martinez is a 68 y.o. female      CC:    diverticulitis    HISTORY OF PRESENT ILLNESS:    Pt is 67 yo F here to follow up episode of diverticultis. On CT scan had thickening of sigmoid colon. Has never had a CS. Patient here for diverticulitis, lower abd pain, and wall thickening on CT. Patient had CT scan of abd and pelvis done at [facility] while in the ER on 6/20/2023 which showed:   IMPRESSION:     *  Inflammation and wall thickening of the mid sigmoid colon with numerous diverticula . Treatment for diverticulitis with follow-up imaging to exclude underlying neoplasm recommended. *  Additional focus of masslike wall thickening distal descending colon should also be followed up to exclude neoplasm. *  Small mildly prominent lymph nodes are present adjacent to the distal descending colon and within the left pelvis internal iliac chain. *  Severe dilatation of the right renal pelvis up to 8 cm. No ureteral distention with configuration suggesting potential chronic UPJ obstruction. No stones or definitive obstructive source.          Abd pain: no  Anemia: yes, years ago but normal   Bloating:no  Diarrhea: yes, depending what patient eats due to having gallbladder removed in 1983  Constipation: no  Melena: no  Hematochezia:no  Rectal Bleeding:no  Rectal/Anal Pain:no  Pruritus: no  Family history colon Cancer: no  Previous colon cancer: no  Previous Colon Polyp: no  Change in bowels: no  Decrease caliber of stool: no  Change in color of stool: no     Previous work up date: No previous CS, cologuard test or FIT occult stool test       Review of Systems:    General:  Fever: Negative  Weight Change:Negative  Night Sweats: Negative    Eye:  Blurry Vision:Negative  Double Vision: Negative    Ent:  Headaches: Negative  Sore throat: Negative    Allergy/Immunology:  Hives: Negative  Latex allergy: Negative    Hematology/Lymphatic:  Bleeding Problems: Negative  Blood Clots: Negative  Swollen Lymph Nodes:
Patient here for diverticulitis, lower abd pain, and wall thickening on CT. Patient had CT scan of abd and pelvis done at [facility] while in the ER on 6/20/2023 which showed:   IMPRESSION:     *  Inflammation and wall thickening of the mid sigmoid colon with numerous diverticula . Treatment for diverticulitis with follow-up imaging to exclude underlying neoplasm recommended. *  Additional focus of masslike wall thickening distal descending colon should also be followed up to exclude neoplasm. *  Small mildly prominent lymph nodes are present adjacent to the distal descending colon and within the left pelvis internal iliac chain. *  Severe dilatation of the right renal pelvis up to 8 cm. No ureteral distention with configuration suggesting potential chronic UPJ obstruction. No stones or definitive obstructive source.        Abd pain: no  Anemia: yes, years ago but normal   Bloating:no  Diarrhea: yes, depending what patient eats due to having gallbladder removed in 1983  Constipation: no  Melena: no  Hematochezia:no  Rectal Bleeding:no  Rectal/Anal Pain:no  Pruritus: no  Family history colon Cancer: no  Previous colon cancer: no  Previous Colon Polyp: no  Change in bowels: no  Decrease caliber of stool: no  Change in color of stool: no    Previous work up date: No previous CS, cologuard test or FIT occult stool test
atrial fibrillation